# Patient Record
Sex: MALE | Race: WHITE | NOT HISPANIC OR LATINO | Employment: OTHER | URBAN - METROPOLITAN AREA
[De-identification: names, ages, dates, MRNs, and addresses within clinical notes are randomized per-mention and may not be internally consistent; named-entity substitution may affect disease eponyms.]

---

## 2022-09-07 LAB — CRC RECOMMENDATION EXT: NORMAL

## 2022-10-20 LAB
LEFT EYE DM RETINOPATHY: NEGATIVE
RIGHT EYE DM RETINOPATHY: NEGATIVE

## 2023-05-10 LAB
CHOLEST SERPL-MSCNC: 160 MG/DL (ref 0–200)
HBA1C MFR BLD: 7.2 % (ref 4–6)
HDLC SERPL-MCNC: 37 MG/DL (ref 35–70)
LDLC SERPL CALC-MCNC: 57 MG/DL
TRIGL SERPL-MCNC: 328 MG/DL (ref 40–160)

## 2023-05-11 LAB
CHOLEST SERPL-MSCNC: 160 MG/DL (ref 0–200)
HBA1C MFR BLD: 7.2 % (ref 4–6)
HDLC SERPL-MCNC: 37 MG/DL (ref 35–70)
LDLC SERPL CALC-MCNC: 57 MG/DL
TRIGL SERPL-MCNC: 328 MG/DL (ref 40–160)
VLDL CHOLESTEROL: 65.6 MG/DL

## 2023-08-03 ENCOUNTER — TELEPHONE (OUTPATIENT)
Dept: PRIMARY CARE CLINIC | Facility: CLINIC | Age: 73
End: 2023-08-03
Payer: MEDICARE

## 2023-08-03 RX ORDER — OMEPRAZOLE 40 MG/1
40 CAPSULE, DELAYED RELEASE ORAL EVERY MORNING
COMMUNITY
Start: 2023-05-25

## 2023-08-03 RX ORDER — GLIMEPIRIDE 4 MG/1
2 TABLET ORAL DAILY
COMMUNITY
Start: 2023-07-24 | End: 2024-01-09 | Stop reason: SDUPTHER

## 2023-08-03 RX ORDER — BLOOD SUGAR DIAGNOSTIC
STRIP MISCELLANEOUS
COMMUNITY
Start: 2023-05-26 | End: 2023-08-28 | Stop reason: SDUPTHER

## 2023-08-03 RX ORDER — EZETIMIBE AND SIMVASTATIN 10; 40 MG/1; MG/1
1 TABLET ORAL EVERY OTHER DAY
COMMUNITY
Start: 2023-05-19 | End: 2024-02-19

## 2023-08-03 RX ORDER — HYDROCHLOROTHIAZIDE 12.5 MG/1
12.5 TABLET ORAL DAILY
COMMUNITY
Start: 2023-06-03

## 2023-08-03 RX ORDER — MESALAMINE 1.2 G/1
4.8 TABLET, DELAYED RELEASE ORAL
COMMUNITY
Start: 2023-07-14

## 2023-08-03 RX ORDER — PIOGLITAZONEHYDROCHLORIDE 45 MG/1
TABLET ORAL
COMMUNITY
End: 2023-08-22 | Stop reason: ALTCHOICE

## 2023-08-03 RX ORDER — ZOLPIDEM TARTRATE 10 MG/1
10 TABLET ORAL NIGHTLY
COMMUNITY
Start: 2023-07-31

## 2023-08-03 RX ORDER — FENOFIBRATE 134 MG/1
134 CAPSULE ORAL
COMMUNITY
Start: 2023-06-15 | End: 2023-09-19 | Stop reason: SDUPTHER

## 2023-08-03 RX ORDER — FLUOXETINE HYDROCHLORIDE 40 MG/1
40 CAPSULE ORAL EVERY MORNING
COMMUNITY
Start: 2023-07-31

## 2023-08-03 RX ORDER — MONTELUKAST SODIUM 10 MG/1
10 TABLET ORAL DAILY
COMMUNITY
Start: 2023-07-23 | End: 2023-10-25 | Stop reason: SDUPTHER

## 2023-08-03 RX ORDER — FLAXSEED OIL 1000 MG
1 CAPSULE ORAL NIGHTLY
COMMUNITY

## 2023-08-03 RX ORDER — FUROSEMIDE 20 MG/1
20 TABLET ORAL EVERY MORNING
COMMUNITY
Start: 2023-07-31 | End: 2024-03-04 | Stop reason: SDUPTHER

## 2023-08-03 RX ORDER — EMPAGLIFLOZIN 25 MG/1
25 TABLET, FILM COATED ORAL DAILY
COMMUNITY
Start: 2023-05-10

## 2023-08-03 RX ORDER — LANCETS 33 GAUGE
EACH MISCELLANEOUS
COMMUNITY
Start: 2023-05-25 | End: 2023-12-11 | Stop reason: SDUPTHER

## 2023-08-03 RX ORDER — ALPRAZOLAM 0.5 MG/1
0.5 TABLET ORAL 2 TIMES DAILY PRN
COMMUNITY
Start: 2023-03-15

## 2023-08-03 RX ORDER — BUPROPION HYDROCHLORIDE 150 MG/1
150 TABLET ORAL EVERY MORNING
COMMUNITY
Start: 2023-07-31

## 2023-08-03 RX ORDER — METOPROLOL SUCCINATE 50 MG/1
50 TABLET, EXTENDED RELEASE ORAL DAILY
COMMUNITY
Start: 2023-05-24

## 2023-08-03 RX ORDER — LEVOTHYROXINE SODIUM 75 UG/1
75 TABLET ORAL EVERY MORNING
COMMUNITY
Start: 2023-07-23 | End: 2023-10-25 | Stop reason: SDUPTHER

## 2023-08-03 RX ORDER — SITAGLIPTIN AND METFORMIN HYDROCHLORIDE 1000; 50 MG/1; MG/1
1 TABLET, FILM COATED ORAL 2 TIMES DAILY
COMMUNITY
Start: 2023-05-26

## 2023-08-03 NOTE — TELEPHONE ENCOUNTER
Spoke with pt, he stated that he really wasn't having chest pain, he stated that it was more so heartburn. Pt is requesting to change the Viagra to Cialis.     I spoke with Dr. Lockhart, after reviewing the chart, he okay the change to Cialis.     Cialis 10 mg daily prn #30 has been sent to pharmacy on file.

## 2023-08-19 PROBLEM — F32.A DEPRESSION: Status: ACTIVE | Noted: 2023-08-19

## 2023-08-19 PROBLEM — E78.2 MIXED HYPERLIPIDEMIA: Status: ACTIVE | Noted: 2023-08-19

## 2023-08-19 PROBLEM — E66.9 OBESE: Status: ACTIVE | Noted: 2023-08-19

## 2023-08-19 PROBLEM — E11.9 DIABETES MELLITUS, TYPE 2: Status: ACTIVE | Noted: 2023-08-19

## 2023-08-19 PROBLEM — K21.9 GERD (GASTROESOPHAGEAL REFLUX DISEASE): Status: ACTIVE | Noted: 2023-08-19

## 2023-08-19 PROBLEM — G47.33 OSA (OBSTRUCTIVE SLEEP APNEA): Status: ACTIVE | Noted: 2023-08-19

## 2023-08-19 PROBLEM — G47.00 INSOMNIA: Status: ACTIVE | Noted: 2023-08-19

## 2023-08-19 PROBLEM — E55.9 VITAMIN D DEFICIENCY: Status: ACTIVE | Noted: 2023-08-19

## 2023-08-19 PROBLEM — E03.9 HYPOTHYROIDISM: Status: ACTIVE | Noted: 2023-08-19

## 2023-08-19 PROBLEM — I10 HYPERTENSION: Status: ACTIVE | Noted: 2023-08-19

## 2023-08-22 ENCOUNTER — OFFICE VISIT (OUTPATIENT)
Dept: FAMILY MEDICINE | Facility: CLINIC | Age: 73
End: 2023-08-22
Payer: MEDICARE

## 2023-08-22 VITALS
TEMPERATURE: 97 F | HEART RATE: 72 BPM | WEIGHT: 244.81 LBS | BODY MASS INDEX: 37.1 KG/M2 | DIASTOLIC BLOOD PRESSURE: 78 MMHG | RESPIRATION RATE: 18 BRPM | SYSTOLIC BLOOD PRESSURE: 138 MMHG | HEIGHT: 68 IN | OXYGEN SATURATION: 97 %

## 2023-08-22 DIAGNOSIS — E66.01 CLASS 2 SEVERE OBESITY DUE TO EXCESS CALORIES WITH SERIOUS COMORBIDITY AND BODY MASS INDEX (BMI) OF 37.0 TO 37.9 IN ADULT: ICD-10-CM

## 2023-08-22 DIAGNOSIS — E11.9 TYPE 2 DIABETES MELLITUS WITHOUT COMPLICATION, WITHOUT LONG-TERM CURRENT USE OF INSULIN: Primary | ICD-10-CM

## 2023-08-22 DIAGNOSIS — G47.33 OSA (OBSTRUCTIVE SLEEP APNEA): ICD-10-CM

## 2023-08-22 DIAGNOSIS — I10 PRIMARY HYPERTENSION: ICD-10-CM

## 2023-08-22 DIAGNOSIS — E78.2 MIXED HYPERLIPIDEMIA: ICD-10-CM

## 2023-08-22 PROBLEM — E66.812 CLASS 2 SEVERE OBESITY DUE TO EXCESS CALORIES WITH SERIOUS COMORBIDITY AND BODY MASS INDEX (BMI) OF 37.0 TO 37.9 IN ADULT: Status: ACTIVE | Noted: 2023-08-19

## 2023-08-22 LAB — HBA1C MFR BLD: 7.5 %

## 2023-08-22 PROCEDURE — 3078F PR MOST RECENT DIASTOLIC BLOOD PRESSURE < 80 MM HG: ICD-10-PCS | Mod: CPTII,,, | Performed by: FAMILY MEDICINE

## 2023-08-22 PROCEDURE — 3051F HG A1C>EQUAL 7.0%<8.0%: CPT | Mod: CPTII,,, | Performed by: FAMILY MEDICINE

## 2023-08-22 PROCEDURE — 1101F PR PT FALLS ASSESS DOC 0-1 FALLS W/OUT INJ PAST YR: ICD-10-PCS | Mod: CPTII,,, | Performed by: FAMILY MEDICINE

## 2023-08-22 PROCEDURE — 99214 OFFICE O/P EST MOD 30 MIN: CPT | Mod: ,,, | Performed by: FAMILY MEDICINE

## 2023-08-22 PROCEDURE — 83036 HEMOGLOBIN GLYCOSYLATED A1C: CPT | Mod: QW,,, | Performed by: FAMILY MEDICINE

## 2023-08-22 PROCEDURE — 3078F DIAST BP <80 MM HG: CPT | Mod: CPTII,,, | Performed by: FAMILY MEDICINE

## 2023-08-22 PROCEDURE — 1159F PR MEDICATION LIST DOCUMENTED IN MEDICAL RECORD: ICD-10-PCS | Mod: CPTII,,, | Performed by: FAMILY MEDICINE

## 2023-08-22 PROCEDURE — 3075F SYST BP GE 130 - 139MM HG: CPT | Mod: CPTII,,, | Performed by: FAMILY MEDICINE

## 2023-08-22 PROCEDURE — 3051F PR MOST RECENT HEMOGLOBIN A1C LEVEL 7.0 - < 8.0%: ICD-10-PCS | Mod: CPTII,,, | Performed by: FAMILY MEDICINE

## 2023-08-22 PROCEDURE — 4010F PR ACE/ARB THEARPY RXD/TAKEN: ICD-10-PCS | Mod: CPTII,,, | Performed by: FAMILY MEDICINE

## 2023-08-22 PROCEDURE — 1101F PT FALLS ASSESS-DOCD LE1/YR: CPT | Mod: CPTII,,, | Performed by: FAMILY MEDICINE

## 2023-08-22 PROCEDURE — 3008F PR BODY MASS INDEX (BMI) DOCUMENTED: ICD-10-PCS | Mod: CPTII,,, | Performed by: FAMILY MEDICINE

## 2023-08-22 PROCEDURE — 83036 POCT HEMOGLOBIN A1C: ICD-10-PCS | Mod: QW,,, | Performed by: FAMILY MEDICINE

## 2023-08-22 PROCEDURE — 3288F PR FALLS RISK ASSESSMENT DOCUMENTED: ICD-10-PCS | Mod: CPTII,,, | Performed by: FAMILY MEDICINE

## 2023-08-22 PROCEDURE — 3288F FALL RISK ASSESSMENT DOCD: CPT | Mod: CPTII,,, | Performed by: FAMILY MEDICINE

## 2023-08-22 PROCEDURE — 1159F MED LIST DOCD IN RCRD: CPT | Mod: CPTII,,, | Performed by: FAMILY MEDICINE

## 2023-08-22 PROCEDURE — 3008F BODY MASS INDEX DOCD: CPT | Mod: CPTII,,, | Performed by: FAMILY MEDICINE

## 2023-08-22 PROCEDURE — 99214 PR OFFICE/OUTPT VISIT, EST, LEVL IV, 30-39 MIN: ICD-10-PCS | Mod: ,,, | Performed by: FAMILY MEDICINE

## 2023-08-22 PROCEDURE — 4010F ACE/ARB THERAPY RXD/TAKEN: CPT | Mod: CPTII,,, | Performed by: FAMILY MEDICINE

## 2023-08-22 PROCEDURE — 3075F PR MOST RECENT SYSTOLIC BLOOD PRESS GE 130-139MM HG: ICD-10-PCS | Mod: CPTII,,, | Performed by: FAMILY MEDICINE

## 2023-08-22 RX ORDER — LISINOPRIL 10 MG/1
10 TABLET ORAL DAILY
COMMUNITY
Start: 2023-08-19

## 2023-08-22 RX ORDER — CHOLECALCIFEROL (VITAMIN D3) 25 MCG
5000 TABLET ORAL DAILY
COMMUNITY

## 2023-08-22 RX ORDER — ASPIRIN 81 MG/1
81 TABLET ORAL DAILY
COMMUNITY
End: 2024-03-04

## 2023-08-22 RX ORDER — ASCORBIC ACID 500 MG
250 TABLET ORAL DAILY
COMMUNITY

## 2023-08-22 NOTE — ASSESSMENT & PLAN NOTE
Had a lengthy discussion with patient about his diet.  Sounds like he has been eating quite a bit of fruit lately.  Although he says he is eating fresh fruits, I think he is eating too much and that is why his A1c has gone up.  Patient voiced a clear understanding and will decrease the consumption of fruit.

## 2023-08-22 NOTE — ASSESSMENT & PLAN NOTE
Patient's LDL is at goal. His last LDL was 57 on May 11, 2023.  He will continue with Vytorin 10/40  Daily and fenofibrate 134 mg daily.

## 2023-08-22 NOTE — PROGRESS NOTES
Patient Name: Rocael Das     Patient ID: [id number]     Chief Complaint: Follow-up (Here for 3 month follow up and A1C today./A1C = 7.5%)      HPI:     Rocael Das is a 72 y.o. male here today for follow-up of his diabetes and hypertension.  Patient says he has been feeling fine.    Past Medical History:   Diagnosis Date    Depression     Diabetes mellitus, type 2     GERD (gastroesophageal reflux disease)     H/O prostate cancer     Hypertension     Hypothyroidism     Insomnia     Mixed hyperlipidemia     Obese     DAWSON (obstructive sleep apnea)     Vitamin D deficiency         Past Surgical History:   Procedure Laterality Date    CRYOTHERAPY      PROSTATE SURGERY          Social History     Socioeconomic History    Marital status:     Number of children: 1   Tobacco Use    Smoking status: Former     Current packs/day: 0.00     Types: Cigarettes     Quit date:      Years since quittin.6    Smokeless tobacco: Never   Substance and Sexual Activity    Drug use: Never        Current Outpatient Medications   Medication Instructions    ALPRAZolam (XANAX) 0.5 mg, Oral, 2 times daily PRN    ascorbic acid (vitamin C) (VITAMIN C) 250 mg, Oral, Daily    aspirin (ECOTRIN) 81 mg, Oral, Daily    buPROPion (WELLBUTRIN XL) 150 mg, Oral, Every morning    ezetimibe-simvastatin 10-40 mg (VYTORIN) 10-40 mg per tablet 1 tablet, Oral, Every other day    fenofibrate micronized (LOFIBRA) 134 mg, Oral    ferrous sulfate (IRON ORAL) 65 mg, Oral, Daily    FLUoxetine 40 mg, Oral, Every morning    furosemide (LASIX) 20 mg, Oral, Every morning    glimepiride (AMARYL) 2 mg, Oral, 2 times daily    glucosamine chauhan 2KCl-chondroit 750-600 mg Tab 1 tablet, Oral, Nightly    hydroCHLOROthiazide (HYDRODIURIL) 12.5 mg, Oral    JANUMET 50-1,000 mg per tablet 1 tablet, Oral, 2 times daily    JARDIANCE 25 mg, Oral, Daily    L.acidophil/L.plantar/Bifido 7 (UP4 PROBIOTICS ADULT ORAL) Oral, Every Mon, Wed, Fri    levothyroxine  "(SYNTHROID) 75 mcg, Oral, Every morning    lisinopriL 10 mg, Oral, Daily    mesalamine (LIALDA) 4.8 g, Oral    metoprolol succinate (TOPROL-XL) 50 mg, Oral, Daily    montelukast (SINGULAIR) 10 mg, Oral, Daily    omeprazole (PRILOSEC) 40 mg, Oral, Every morning    ONETOUCH DELICA PLUS LANCET 33 gauge Misc SMARTSIG:Via Meter Daily    ONETOUCH ULTRA TEST Strp SMARTSIG:Via Meter Daily    vitamin D (VITAMIN D3) 50,000 Units, Oral, Daily    zolpidem (AMBIEN) 10 mg, Oral, Nightly       Review of patient's allergies indicates:   Allergen Reactions    Balsalazide Other (See Comments)    Macrolide antibiotics     Pcn [penicillins]     Erythromycin Rash        Immunization History   Administered Date(s) Administered    COVID-19, MRNA, LN-S, PF (Pfizer) (Purple Cap) 09/02/2021, 09/24/2021    Influenza (FLUAD) - Quadrivalent - Adjuvanted - PF *Preferred* (65+) 10/16/2020    Influenza - High Dose - PF (65 years and older) 01/09/2018, 08/28/2018, 10/02/2019    Influenza - Quadrivalent - PF *Preferred* (6 months and older) 10/30/2002, 01/21/2005, 10/13/2015    Pneumococcal Conjugate - 13 Valent 01/09/2018    Pneumococcal Polysaccharide - 23 Valent 10/30/2002, 10/02/2019    Zoster 12/11/2012    Zoster Recombinant 06/07/2018, 08/28/2018       Patient Care Team:  Donnie Das Sr., MD as PCP - General (Family Medicine)  Hardeep Ochoa FNP (Nurse Practitioner)  Andrey Borja MD as Consulting Physician (Gastroenterology)  Jourdan Flanagan MD as Consulting Physician (Pulmonary Disease)     Subjective:     Review of Systems    10 point review of systems conducted, negative except as stated in the history of present illness. See HPI for details.    Objective:     Visit Vitals  /78 (BP Location: Left arm, Patient Position: Sitting, BP Method: Large (Manual))   Pulse 72   Temp 96.5 °F (35.8 °C)   Resp 18   Ht 5' 8" (1.727 m)   Wt 111 kg (244 lb 12.8 oz)   SpO2 97%   BMI 37.22 kg/m²       Physical Exam  Constitutional:  "      Appearance: He is obese.   HENT:      Head: Normocephalic and atraumatic.   Cardiovascular:      Rate and Rhythm: Normal rate and regular rhythm.   Pulmonary:      Effort: Pulmonary effort is normal.      Breath sounds: Normal breath sounds.   Abdominal:      Palpations: Abdomen is soft.   Musculoskeletal:      Cervical back: Normal range of motion and neck supple.      Right lower leg: No edema.      Left lower leg: No edema.   Skin:     General: Skin is warm and dry.   Neurological:      General: No focal deficit present.      Mental Status: He is alert and oriented to person, place, and time.   Psychiatric:         Mood and Affect: Mood normal.           Assessment:       ICD-10-CM ICD-9-CM   1. Type 2 diabetes mellitus without complication, without long-term current use of insulin  E11.9 250.00   2. Primary hypertension  I10 401.9   3. Mixed hyperlipidemia  E78.2 272.2   4. Class 2 severe obesity due to excess calories with serious comorbidity and body mass index (BMI) of 37.0 to 37.9 in adult  E66.01 278.01    Z68.37 V85.37   5. DAWSON (obstructive sleep apnea)  G47.33 327.23        Plan:     1. Type 2 diabetes mellitus without complication, without long-term current use of insulin  Overview:  Continue present med tx.  Glimepiride 2 mg b.i.d.  ,  Janumet 50/1000 1 b.i.d., Jardiance 25 mg daily.                                                         Follow ADA Diet. Avoid soda, simple sweets, and limit rice/pasta/breads/starches (no more than 45-50 grams per meal).  Maintain healthy weight with goal BMI <30.  Exercise 5 times per week for 30 minutes per day.  Stressed importance of daily foot exams especially if neuropathy is present.  Patient was instructed to notify physician if they notice any sores or skin lesions on the feet.  Stressed the importance of wearing proper fitting comfortable shoes i.e. diabetic footwear.  They were instructed to always wear shoes and never go barefooted.  Stressed importance  of annual dilated eye exam.          Assessment & Plan:  Had a lengthy discussion with patient about his diet.  Sounds like he has been eating quite a bit of fruit lately.  Although he says he is eating fresh fruits, I think he is eating too much and that is why his A1c has gone up.  Patient voiced a clear understanding and will decrease the consumption of fruit.    Orders:  -     Hemoglobin A1C, POCT    2. Primary hypertension  Overview:  Continue present med tx.  Metoprolol 50 mg daily, lisinopril 10 mg daily, furosemide 20 mg q.a.m. HCTZ 12.5 mg daily.      Low Sodium Diet (DASH Diet - Less than 2 grams of sodium per day).  Monitor blood pressure daily and log. Report consistent numbers greater than 140/90.  Maintain healthy weight with goal BMI <30. Exercise 30 minutes per day, 5 days per week.    Assessment & Plan:  Patient's blood pressure is at goal.      3. Mixed hyperlipidemia  Overview:  Stressed importance of dietary modifications. Follow a low cholesterol, low saturated fat diet with less that 200mg of cholesterol a day.  Avoid fried foods and high saturated fats (high saturated fats less than 7% of calories).  Add Flax Seed/Fish Oil supplements to diet. Increase dietary fiber.  Regular exercise can reduce LDL and raise HDL. Stressed importance of physical activity 5 times per week for 30 minutes per day.     Assessment & Plan:  Patient's LDL is at goal. His last LDL was 57 on May 11, 2023.  He will continue with Vytorin 10/40  Daily and fenofibrate 134 mg daily.      4. Class 2 severe obesity due to excess calories with serious comorbidity and body mass index (BMI) of 37.0 to 37.9 in adult  Overview:  Body mass index is 37.22 kg/m².  Goal BMI <30.  Exercise 5 times a week for 30 minutes per day.  Avoid soda, simple sugars, excessive rice, potatoes or bread. Limit fast foods and fried foods.  Choose complex carbs in moderation (example: green vegetables, beans, oatmeal). Eat plenty of fresh fruits and  vegetables with lean meats daily.  Do not skip meals. Eat a balanced portion size.  Avoid fad diets. Consider permanent healthy life style changes.       5. DAWSON (obstructive sleep apnea)  Overview:  Wears CPAP/BiPAP and reports compliance nightly. Life time use expected.  Has experienced improved daytime fatigue.  Avoid excessive alcohol, smoking and overuse of sedatives at bedtime.  Weight management discussed. Goal BMI <30.  Adjust sleep position PRN.          [x] Discussed lab findings with the patient.  [x] Discussed diet, exercise and if appropriate, weight loss.  [] Instructions and information, including risks and benefits of prescribed medication(s) have been reviewed with the patient and patient verbalizes understanding. Questions have been answered to the patient's satisfaction.  [] Appropriate counseling has been given regarding anxiety and depressive issues that were discussed today.  [] Any lab drawn today will be reviewed by physician at the time it is received and appropriate recommendations bill be made and discussed with patient.     No follow-ups on file.   In addition to their scheduled follow up, the patient has also been instructed to follow up on as needed basis.     Future Appointments   Date Time Provider Department Center   11/15/2023  1:00 PM Donnie Das Sr., MD [facility] Das Sr, MD

## 2023-08-28 DIAGNOSIS — E11.9 TYPE 2 DIABETES MELLITUS WITHOUT COMPLICATION, WITHOUT LONG-TERM CURRENT USE OF INSULIN: Primary | ICD-10-CM

## 2023-08-29 ENCOUNTER — TELEPHONE (OUTPATIENT)
Dept: FAMILY MEDICINE | Facility: CLINIC | Age: 73
End: 2023-08-29
Payer: MEDICARE

## 2023-08-29 NOTE — TELEPHONE ENCOUNTER
----- Message from Daniela Schultz sent at 8/28/2023  2:54 PM CDT -----  Regarding: Refill  Need One touch ultra test strips.  States the container shows 2 refills but pharmacy will not fill without a new order.  Wants enough strips to test twice a day, without having to refill as often.    Send order to [facility] in [place].

## 2023-09-19 ENCOUNTER — TELEPHONE (OUTPATIENT)
Dept: FAMILY MEDICINE | Facility: CLINIC | Age: 73
End: 2023-09-19
Payer: MEDICARE

## 2023-09-19 DIAGNOSIS — E78.2 MIXED HYPERLIPIDEMIA: Primary | ICD-10-CM

## 2023-09-19 RX ORDER — FENOFIBRATE 134 MG/1
134 CAPSULE ORAL DAILY
Qty: 90 CAPSULE | Refills: 2 | Status: SHIPPED | OUTPATIENT
Start: 2023-09-19 | End: 2024-03-04 | Stop reason: ALTCHOICE

## 2023-09-19 NOTE — TELEPHONE ENCOUNTER
Spoke with patient and he confirmed it was [facility] to refill his medication. Medication refilled.PH

## 2023-09-19 NOTE — TELEPHONE ENCOUNTER
----- Message from Daniela Schultz sent at 9/19/2023  1:35 PM CDT -----  Regarding: Refill  Second request.    Fenofibrate 100 mg  [facility].

## 2023-09-28 ENCOUNTER — OFFICE VISIT (OUTPATIENT)
Dept: FAMILY MEDICINE | Facility: CLINIC | Age: 73
End: 2023-09-28
Payer: MEDICARE

## 2023-09-28 VITALS — WEIGHT: 249.19 LBS | BODY MASS INDEX: 37.77 KG/M2 | HEIGHT: 68 IN

## 2023-09-28 DIAGNOSIS — L73.9 FOLLICULITIS: Primary | ICD-10-CM

## 2023-09-28 PROCEDURE — 4010F PR ACE/ARB THEARPY RXD/TAKEN: ICD-10-PCS | Mod: CPTII,,, | Performed by: FAMILY MEDICINE

## 2023-09-28 PROCEDURE — 99213 OFFICE O/P EST LOW 20 MIN: CPT | Mod: ,,, | Performed by: FAMILY MEDICINE

## 2023-09-28 PROCEDURE — 3008F PR BODY MASS INDEX (BMI) DOCUMENTED: ICD-10-PCS | Mod: CPTII,,, | Performed by: FAMILY MEDICINE

## 2023-09-28 PROCEDURE — 1159F MED LIST DOCD IN RCRD: CPT | Mod: CPTII,,, | Performed by: FAMILY MEDICINE

## 2023-09-28 PROCEDURE — 1159F PR MEDICATION LIST DOCUMENTED IN MEDICAL RECORD: ICD-10-PCS | Mod: CPTII,,, | Performed by: FAMILY MEDICINE

## 2023-09-28 PROCEDURE — 3051F HG A1C>EQUAL 7.0%<8.0%: CPT | Mod: CPTII,,, | Performed by: FAMILY MEDICINE

## 2023-09-28 PROCEDURE — 99213 PR OFFICE/OUTPT VISIT, EST, LEVL III, 20-29 MIN: ICD-10-PCS | Mod: ,,, | Performed by: FAMILY MEDICINE

## 2023-09-28 PROCEDURE — 4010F ACE/ARB THERAPY RXD/TAKEN: CPT | Mod: CPTII,,, | Performed by: FAMILY MEDICINE

## 2023-09-28 PROCEDURE — 3008F BODY MASS INDEX DOCD: CPT | Mod: CPTII,,, | Performed by: FAMILY MEDICINE

## 2023-09-28 PROCEDURE — 3051F PR MOST RECENT HEMOGLOBIN A1C LEVEL 7.0 - < 8.0%: ICD-10-PCS | Mod: CPTII,,, | Performed by: FAMILY MEDICINE

## 2023-09-28 RX ORDER — DOXYCYCLINE HYCLATE 50 MG/1
50 CAPSULE ORAL 2 TIMES DAILY
Qty: 20 CAPSULE | Refills: 0 | Status: SHIPPED | OUTPATIENT
Start: 2023-09-28 | End: 2023-10-08

## 2023-09-28 RX ORDER — MUPIROCIN CALCIUM 20 MG/G
CREAM TOPICAL 2 TIMES DAILY
Qty: 30 G | Refills: 0 | Status: SHIPPED | OUTPATIENT
Start: 2023-09-28 | End: 2024-03-04 | Stop reason: ALTCHOICE

## 2023-09-28 NOTE — PROGRESS NOTES
Patient Name: Rocael Das     Patient ID: [id number]     Chief Complaint: Rash (Here for rash under both arm pits, its been about three weeks. Used Calamine lotion , Cortizone 10, Neosporin Oint, Medicated powder.)      HPI:     Rocael Das is a 72 y.o. male here today for bilateral axilla rash.    Past Medical History:   Diagnosis Date    Depression     Diabetes mellitus, type 2     GERD (gastroesophageal reflux disease)     H/O prostate cancer     Hypertension     Hypothyroidism     Insomnia     Mixed hyperlipidemia     Obese     DAWSON (obstructive sleep apnea)     Vitamin D deficiency         Past Surgical History:   Procedure Laterality Date    CRYOTHERAPY      PROSTATE SURGERY          Social History     Socioeconomic History    Marital status:     Number of children: 1   Tobacco Use    Smoking status: Former     Current packs/day: 0.00     Types: Cigarettes     Quit date:      Years since quittin.7    Smokeless tobacco: Never   Substance and Sexual Activity    Drug use: Never        Current Outpatient Medications   Medication Instructions    ALPRAZolam (XANAX) 0.5 mg, Oral, 2 times daily PRN    ascorbic acid (vitamin C) (VITAMIN C) 250 mg, Oral, Daily    aspirin (ECOTRIN) 81 mg, Oral, Daily    blood sugar diagnostic (ONETOUCH ULTRA TEST) Strp Use to test blood sugar once daily    buPROPion (WELLBUTRIN XL) 150 mg, Oral, Every morning    ezetimibe-simvastatin 10-40 mg (VYTORIN) 10-40 mg per tablet 1 tablet, Oral, Every other day    fenofibrate micronized (LOFIBRA) 134 mg, Oral, Daily    ferrous sulfate (IRON ORAL) 65 mg, Oral, Daily    FLUoxetine 40 mg, Oral, Every morning    furosemide (LASIX) 20 mg, Oral, Every morning    glimepiride (AMARYL) 2 mg, Oral, Daily, Patient takes 2 mg by mouth after breakfast and  4 mg by mouth every evening after supper.    glucosamine chauhan 2KCl-chondroit 750-600 mg Tab 1 tablet, Oral, Nightly    hydroCHLOROthiazide (HYDRODIURIL) 12.5 mg, Oral    JANUMET  "50-1,000 mg per tablet 1 tablet, Oral, 2 times daily    JARDIANCE 25 mg, Oral, Daily    L.acidophil/L.plantar/Bifido 7 (UP4 PROBIOTICS ADULT ORAL) Oral, Every Mon, Wed, Fri    levothyroxine (SYNTHROID) 75 mcg, Oral, Every morning    lisinopriL 10 mg, Oral, Daily    mesalamine (LIALDA) 4.8 g, Oral, With breakfast    metoprolol succinate (TOPROL-XL) 50 mg, Oral, Daily    montelukast (SINGULAIR) 10 mg, Oral, Daily    omeprazole (PRILOSEC) 40 mg, Oral, Every morning    ONETOUCH DELICA PLUS LANCET 33 gauge Misc SMARTSIG:Via Meter Daily    vitamin D (VITAMIN D3) 5,000 Units, Oral, Daily    zolpidem (AMBIEN) 10 mg, Oral, Nightly       Review of patient's allergies indicates:   Allergen Reactions    Balsalazide Other (See Comments)    Macrolide antibiotics     Pcn [penicillins]     Erythromycin Rash        Immunization History   Administered Date(s) Administered    COVID-19, MRNA, LN-S, PF (Pfizer) (Purple Cap) 09/02/2021, 09/24/2021    Influenza (FLUAD) - Quadrivalent - Adjuvanted - PF *Preferred* (65+) 10/16/2020    Influenza - High Dose - PF (65 years and older) 01/09/2018, 08/28/2018, 10/02/2019    Influenza - Quadrivalent - PF *Preferred* (6 months and older) 10/30/2002, 01/21/2005, 10/13/2015    Pneumococcal Conjugate - 13 Valent 01/09/2018    Pneumococcal Polysaccharide - 23 Valent 10/30/2002, 10/02/2019    Zoster 12/11/2012    Zoster Recombinant 06/07/2018, 08/28/2018       Patient Care Team:  Donnie Das Sr., MD as PCP - General (Family Medicine)  Hardeep Ochoa FNP (Nurse Practitioner)  Andrey Borja MD as Consulting Physician (Gastroenterology)  Jourdan Flanagan MD as Consulting Physician (Pulmonary Disease)     Subjective:     Review of Systems    10 point review of systems conducted, negative except as stated in the history of present illness. See HPI for details.    Objective:     Visit Vitals  Ht 5' 8" (1.727 m)   Wt 113 kg (249 lb 3.2 oz)   BMI 37.89 kg/m²       Physical " Exam  Constitutional:       Appearance: He is obese.   Cardiovascular:      Rate and Rhythm: Normal rate and regular rhythm.   Pulmonary:      Effort: Pulmonary effort is normal.      Breath sounds: Normal breath sounds.   Abdominal:      Palpations: Abdomen is soft.   Musculoskeletal:      Right lower leg: No edema.      Left lower leg: No edema.   Skin:     Comments: Patient has a fine, reddened,maculopapular rash under both arms.   Neurological:      Mental Status: He is alert.           Assessment:       ICD-10-CM ICD-9-CM   1. Folliculitis axillary  L73.9 704.8        Plan:     1. Folliculitis axillary  Comments:  Keep area cool and dry. Will start Doxycycline 50 mg BID for 10 days and Bactroban 2% apply to affected area BID.        [] Discussed lab findings with the patient.  [] Discussed diet, exercise and if appropriate, weight loss.  [x] Instructions and information, including risks and benefits of prescribed medication(s) have been reviewed with the patient and patient verbalizes understanding. Questions have been answered to the patient's satisfaction.  [] Appropriate counseling has been given regarding anxiety and depressive issues that were discussed today.  [] Any lab drawn today will be reviewed by physician at the time it is received and appropriate recommendations bill be made and discussed with patient.     Follow up if symptoms worsen or fail to improve.   In addition to their scheduled follow up, the patient has also been instructed to follow up on as needed basis.     Future Appointments   Date Time Provider Department Center   11/15/2023  1:00 PM Donnie Das Sr., MD [facility] Das Sr, MD

## 2023-10-25 ENCOUNTER — TELEPHONE (OUTPATIENT)
Dept: FAMILY MEDICINE | Facility: CLINIC | Age: 73
End: 2023-10-25
Payer: MEDICARE

## 2023-10-25 DIAGNOSIS — E03.9 HYPOTHYROIDISM, UNSPECIFIED TYPE: ICD-10-CM

## 2023-10-25 DIAGNOSIS — T78.40XS ALLERGY, SEQUELA: Primary | ICD-10-CM

## 2023-10-25 RX ORDER — MONTELUKAST SODIUM 10 MG/1
10 TABLET ORAL DAILY
Qty: 90 TABLET | Refills: 1 | Status: SHIPPED | OUTPATIENT
Start: 2023-10-25

## 2023-10-25 RX ORDER — LEVOTHYROXINE SODIUM 75 UG/1
75 TABLET ORAL EVERY MORNING
Qty: 90 TABLET | Refills: 1 | Status: SHIPPED | OUTPATIENT
Start: 2023-10-25

## 2023-10-25 NOTE — TELEPHONE ENCOUNTER
----- Message from Dolores Carson sent at 10/23/2023  2:13 PM CDT -----  Regarding: MED REFILL  HE NEEDS REFILLS CALLED TO [facility]

## 2023-12-11 ENCOUNTER — TELEPHONE (OUTPATIENT)
Dept: FAMILY MEDICINE | Facility: CLINIC | Age: 73
End: 2023-12-11
Payer: MEDICARE

## 2023-12-11 DIAGNOSIS — E11.9 TYPE 2 DIABETES MELLITUS WITHOUT COMPLICATION, WITHOUT LONG-TERM CURRENT USE OF INSULIN: ICD-10-CM

## 2023-12-11 RX ORDER — LANCETS 33 GAUGE
EACH MISCELLANEOUS
Qty: 100 EACH | Refills: 11 | Status: SHIPPED | OUTPATIENT
Start: 2023-12-11

## 2023-12-11 NOTE — TELEPHONE ENCOUNTER
Called patient and notified him that his Lancets and test strips were refilled at [facility] as requested.

## 2023-12-11 NOTE — TELEPHONE ENCOUNTER
----- Message from Daniela Schultz sent at 12/11/2023 10:11 AM CST -----  Regarding: lancets  Needs refill 1 touch lancets.  KASSANDRA BRYAN

## 2023-12-12 ENCOUNTER — DOCUMENTATION ONLY (OUTPATIENT)
Dept: FAMILY MEDICINE | Facility: CLINIC | Age: 73
End: 2023-12-12
Payer: MEDICARE

## 2023-12-12 LAB
LEFT EYE DM RETINOPATHY: NEGATIVE
RIGHT EYE DM RETINOPATHY: NEGATIVE

## 2024-01-09 ENCOUNTER — TELEPHONE (OUTPATIENT)
Dept: FAMILY MEDICINE | Facility: CLINIC | Age: 74
End: 2024-01-09
Payer: MEDICARE

## 2024-01-09 DIAGNOSIS — E11.9 TYPE 2 DIABETES MELLITUS WITHOUT COMPLICATION, WITHOUT LONG-TERM CURRENT USE OF INSULIN: Primary | ICD-10-CM

## 2024-01-09 RX ORDER — GLIMEPIRIDE 4 MG/1
TABLET ORAL
Qty: 135 TABLET | Refills: 0 | Status: SHIPPED | OUTPATIENT
Start: 2024-01-09

## 2024-01-09 NOTE — TELEPHONE ENCOUNTER
----- Message from Dolores Carson sent at 1/8/2024  8:39 AM CST -----  Regarding: MED REFILL  HE NEEDS HIS GLIMPRIDE 40MG, HE SAID DOC INCREASED AND [facility] WON'T REFILL BECAUSE IT IS TOO SOON ACCORDING TO SCRIPT

## 2024-02-09 NOTE — TELEPHONE ENCOUNTER
Called and spoke with patient and advised him , he needs to call [facility] and let them know he will no longer be filling his medication of Jardiance 25 mg with them . Switching to [facility] in [place]. He will call . Patient verbalized an understanding.

## 2024-02-09 NOTE — TELEPHONE ENCOUNTER
----- Message from Dolores Carson sent at 2/9/2024 10:08 AM CST -----  Regarding: MED REFILL  KASSANDRA JENKINS CALLED ABOUT HIS JARDIANCE

## 2024-02-19 DIAGNOSIS — E78.2 MIXED HYPERLIPIDEMIA: Primary | ICD-10-CM

## 2024-02-19 RX ORDER — EZETIMIBE AND SIMVASTATIN 10; 40 MG/1; MG/1
1 TABLET ORAL NIGHTLY
Qty: 90 TABLET | Refills: 1 | Status: SHIPPED | OUTPATIENT
Start: 2024-02-19 | End: 2025-02-18

## 2024-03-04 ENCOUNTER — DOCUMENTATION ONLY (OUTPATIENT)
Dept: FAMILY MEDICINE | Facility: CLINIC | Age: 74
End: 2024-03-04

## 2024-03-04 ENCOUNTER — OFFICE VISIT (OUTPATIENT)
Dept: FAMILY MEDICINE | Facility: CLINIC | Age: 74
End: 2024-03-04
Payer: MEDICARE

## 2024-03-04 VITALS
DIASTOLIC BLOOD PRESSURE: 70 MMHG | OXYGEN SATURATION: 95 % | RESPIRATION RATE: 20 BRPM | SYSTOLIC BLOOD PRESSURE: 124 MMHG | HEIGHT: 68 IN | HEART RATE: 62 BPM | BODY MASS INDEX: 37.13 KG/M2 | WEIGHT: 245 LBS

## 2024-03-04 DIAGNOSIS — G47.33 OSA (OBSTRUCTIVE SLEEP APNEA): ICD-10-CM

## 2024-03-04 DIAGNOSIS — I10 PRIMARY HYPERTENSION: ICD-10-CM

## 2024-03-04 DIAGNOSIS — E11.9 TYPE 2 DIABETES MELLITUS WITHOUT COMPLICATION, WITHOUT LONG-TERM CURRENT USE OF INSULIN: Primary | ICD-10-CM

## 2024-03-04 DIAGNOSIS — E66.01 CLASS 2 SEVERE OBESITY DUE TO EXCESS CALORIES WITH SERIOUS COMORBIDITY AND BODY MASS INDEX (BMI) OF 37.0 TO 37.9 IN ADULT: ICD-10-CM

## 2024-03-04 DIAGNOSIS — E78.2 MIXED HYPERLIPIDEMIA: ICD-10-CM

## 2024-03-04 LAB
BILIRUB SERPL-MCNC: NORMAL MG/DL
BLOOD URINE, POC: NORMAL
CLARITY, POC UA: CLEAR
COLOR, POC UA: YELLOW
CREATININE, URINE: 50 MG/DL
GLUCOSE UR QL STRIP: NORMAL
HBA1C MFR BLD: 7.3 %
KETONES UR QL STRIP: NORMAL
LEUKOCYTE ESTERASE URINE, POC: NORMAL
MICROALBUMIN URINE: 10
MICROALBUMIN/CREATININE RATIO: <30 UG/MG
NITRITE, POC UA: NORMAL
PH, POC UA: 5.5
POC CREATININE: 50 MG/DL (ref 0.6–1.3)
POC MICROALBUMIN URINE: 10
POC MICROALBUMIN/CREATININE RATIO: <30 ΜG/MG (ref 0–30)
PROTEIN, POC: NORMAL
SPECIFIC GRAVITY, POC UA: 1.02
UROBILINOGEN, POC UA: NORMAL

## 2024-03-04 PROCEDURE — 82044 UR ALBUMIN SEMIQUANTITATIVE: CPT | Mod: QW,,, | Performed by: FAMILY MEDICINE

## 2024-03-04 PROCEDURE — 3051F HG A1C>EQUAL 7.0%<8.0%: CPT | Mod: CPTII,,, | Performed by: FAMILY MEDICINE

## 2024-03-04 PROCEDURE — 3074F SYST BP LT 130 MM HG: CPT | Mod: CPTII,,, | Performed by: FAMILY MEDICINE

## 2024-03-04 PROCEDURE — 3288F FALL RISK ASSESSMENT DOCD: CPT | Mod: CPTII,,, | Performed by: FAMILY MEDICINE

## 2024-03-04 PROCEDURE — 1101F PT FALLS ASSESS-DOCD LE1/YR: CPT | Mod: CPTII,,, | Performed by: FAMILY MEDICINE

## 2024-03-04 PROCEDURE — 3008F BODY MASS INDEX DOCD: CPT | Mod: CPTII,,, | Performed by: FAMILY MEDICINE

## 2024-03-04 PROCEDURE — 99214 OFFICE O/P EST MOD 30 MIN: CPT | Mod: ,,, | Performed by: FAMILY MEDICINE

## 2024-03-04 PROCEDURE — 3078F DIAST BP <80 MM HG: CPT | Mod: CPTII,,, | Performed by: FAMILY MEDICINE

## 2024-03-04 PROCEDURE — 81002 URINALYSIS NONAUTO W/O SCOPE: CPT | Mod: ,,, | Performed by: FAMILY MEDICINE

## 2024-03-04 PROCEDURE — 83036 HEMOGLOBIN GLYCOSYLATED A1C: CPT | Mod: QW,,, | Performed by: FAMILY MEDICINE

## 2024-03-04 PROCEDURE — 4010F ACE/ARB THERAPY RXD/TAKEN: CPT | Mod: CPTII,,, | Performed by: FAMILY MEDICINE

## 2024-03-04 RX ORDER — TIRZEPATIDE 2.5 MG/.5ML
2.5 INJECTION, SOLUTION SUBCUTANEOUS
Qty: 4 PEN | Refills: 0 | Status: SHIPPED | OUTPATIENT
Start: 2024-03-04 | End: 2024-06-11 | Stop reason: DRUGHIGH

## 2024-03-04 RX ORDER — ASPIRIN 81 MG/1
81 TABLET ORAL DAILY
COMMUNITY

## 2024-03-04 RX ORDER — GLIMEPIRIDE 4 MG/1
TABLET ORAL
Qty: 135 TABLET | Refills: 0 | Status: SHIPPED | OUTPATIENT
Start: 2024-03-04 | End: 2024-06-06 | Stop reason: SDUPTHER

## 2024-03-04 RX ORDER — OXYBUTYNIN CHLORIDE 5 MG/1
5 TABLET ORAL 2 TIMES DAILY
COMMUNITY
Start: 2024-02-28

## 2024-03-04 RX ORDER — FUROSEMIDE 20 MG/1
20 TABLET ORAL EVERY MORNING
Qty: 90 EACH | Refills: 1 | Status: SHIPPED | OUTPATIENT
Start: 2024-03-04 | End: 2024-05-23 | Stop reason: SDUPTHER

## 2024-03-04 RX ORDER — TIRZEPATIDE 5 MG/.5ML
5 INJECTION, SOLUTION SUBCUTANEOUS
Qty: 8 PEN | Refills: 2 | Status: SHIPPED | OUTPATIENT
Start: 2024-03-04 | End: 2024-06-11 | Stop reason: DRUGHIGH

## 2024-03-04 NOTE — ASSESSMENT & PLAN NOTE
Patient's last A1c was done in the office today and it was 7.3.  I had a discussion with the patient regarding the new GLP agonist injections and he was amenable to trying 1 of these.  We will issue a prescription for Mounjaro 2.5 mg subQ weekly.  We will continue to monitor

## 2024-03-04 NOTE — PROGRESS NOTES
Patient Name: Rocael Das     Patient ID: [id number]     Chief Complaint: Diabetes (Patient here today for a A1C. A1C 7.3%)      HPI:     Rocael Das is a 73 y.o. male here today for follow-up of diabetes,hypertension and hyperlipidemia.    Past Medical History:   Diagnosis Date    Depression     Diabetes mellitus, type 2     GERD (gastroesophageal reflux disease)     H/O prostate cancer     Hypertension     Hypothyroidism     Insomnia     Mixed hyperlipidemia     Obese     DAWSON (obstructive sleep apnea)     Vitamin D deficiency         Past Surgical History:   Procedure Laterality Date    COLONOSCOPY  2022    Dr Borja  Repeat 2 years    CRYOTHERAPY  2013    PROSTATE SURGERY  2013        Social History     Socioeconomic History    Marital status:     Number of children: 1   Tobacco Use    Smoking status: Former     Current packs/day: 0.00     Types: Cigarettes     Quit date:      Years since quittin.2    Smokeless tobacco: Never   Substance and Sexual Activity    Drug use: Never        Current Outpatient Medications   Medication Instructions    ALPRAZolam (XANAX) 0.5 mg, Oral, 2 times daily PRN    ascorbic acid (vitamin C) (VITAMIN C) 250 mg, Oral, Daily    aspirin (ECOTRIN) 81 mg, Oral, Daily    blood sugar diagnostic (ONETOUCH ULTRA TEST) Strp Use to test blood sugar once daily    buPROPion (WELLBUTRIN XL) 150 mg, Oral, Every morning    ezetimibe-simvastatin 10-40 mg (VYTORIN) 10-40 mg per tablet 1 tablet, Oral, Nightly    ferrous sulfate (IRON ORAL) 65 mg, Oral, Daily    FLUoxetine 40 mg, Oral, Every morning    furosemide (LASIX) 20 mg, Oral, Every morning    glimepiride (AMARYL) 4 MG tablet Patient takes 2 mg by mouth after breakfast and  4 mg by mouth every evening after supper.    glucosamine chauhan 2KCl-chondroit 750-600 mg Tab 1 tablet, Oral, Nightly    hydroCHLOROthiazide (HYDRODIURIL) 12.5 mg, Oral, Daily    JANUMET 50-1,000 mg per tablet 1 tablet, Oral, 2 times daily     JARDIANCE 25 mg, Oral, Daily    L.acidophil/L.plantar/Bifido 7 (UP4 PROBIOTICS ADULT ORAL) Oral, Every Mon, Wed, Fri    levothyroxine (SYNTHROID) 75 mcg, Oral, Every morning    lisinopriL 10 mg, Oral, Daily    mesalamine (LIALDA) 4.8 g, Oral, With breakfast    metoprolol succinate (TOPROL-XL) 50 mg, Oral, Daily    montelukast (SINGULAIR) 10 mg, Oral, Daily    mupirocin calcium 2% (BACTROBAN) 2 % cream Topical (Top), 2 times daily    omeprazole (PRILOSEC) 40 mg, Oral, Every morning    ONETOUCH DELICA PLUS LANCET 33 gauge Misc SMARTSIG:Via Meter Daily    oxybutynin (DITROPAN) 5 mg, Oral, 2 times daily    vitamin D (VITAMIN D3) 5,000 Units, Oral, Daily    zolpidem (AMBIEN) 10 mg, Oral, Nightly       Review of patient's allergies indicates:   Allergen Reactions    Balsalazide Other (See Comments)    Macrolide antibiotics     Pcn [penicillins]     Erythromycin Rash        Immunization History   Administered Date(s) Administered    COVID-19, MRNA, LN-S, PF (Pfizer) (Purple Cap) 09/02/2021, 09/24/2021    Influenza (FLUAD) - Quadrivalent - Adjuvanted - PF *Preferred* (65+) 10/16/2020    Influenza - High Dose - PF (65 years and older) 01/09/2018, 08/28/2018, 10/02/2019    Influenza - Quadrivalent - PF *Preferred* (6 months and older) 10/30/2002, 01/21/2005, 10/13/2015    Pneumococcal Conjugate - 13 Valent 01/09/2018    Pneumococcal Polysaccharide - 23 Valent 10/30/2002, 10/02/2019    Zoster 12/11/2012    Zoster Recombinant 06/07/2018, 08/28/2018       Patient Care Team:  Donnie Das Sr., MD as PCP - General (Family Medicine)  Hardeep Ochoa FNP (Nurse Practitioner)  Andrey Borja MD as Consulting Physician (Gastroenterology)  Jourdan Flanagan MD as Consulting Physician (Pulmonary Disease)  Maurice Mcmanus OD (Ophthalmology)  Dakotah Wheat MD as Consulting Physician (Urology)  Hardeep Ochoa FNP (Nurse Practitioner)     Subjective:     Review of Systems    10 point review of systems conducted,  "negative except as stated in the history of present illness. See HPI for details.    Objective:     Visit Vitals  /70 (BP Location: Right arm, Patient Position: Sitting, BP Method: Medium (Manual))   Pulse 62   Resp 20   Ht 5' 8" (1.727 m)   Wt 111.1 kg (245 lb)   SpO2 95%   BMI 37.25 kg/m²       Physical Exam  Constitutional:       Appearance: He is obese.   HENT:      Head: Normocephalic and atraumatic.   Cardiovascular:      Rate and Rhythm: Normal rate and regular rhythm.   Pulmonary:      Effort: Pulmonary effort is normal.      Breath sounds: Normal breath sounds.   Abdominal:      Palpations: Abdomen is soft.      Tenderness: There is no abdominal tenderness.   Musculoskeletal:         General: No swelling or tenderness. Normal range of motion.      Cervical back: Normal range of motion and neck supple.      Right lower leg: No edema.      Left lower leg: No edema.   Lymphadenopathy:      Cervical: No cervical adenopathy.   Skin:     General: Skin is warm and dry.   Neurological:      General: No focal deficit present.      Mental Status: He is alert and oriented to person, place, and time.   Psychiatric:         Mood and Affect: Mood normal.         Assessment:       ICD-10-CM ICD-9-CM   1. Type 2 diabetes mellitus without complication, without long-term current use of insulin  E11.9 250.00   2. Primary hypertension  I10 401.9   3. Mixed hyperlipidemia  E78.2 272.2   4. Class 2 severe obesity due to excess calories with serious comorbidity and body mass index (BMI) of 37.0 to 37.9 in adult  E66.01 278.01    Z68.37 V85.37   5. DAWSON (obstructive sleep apnea)  G47.33 327.23       Plan:   1. Type 2 diabetes mellitus without complication, without long-term current use of insulin  Overview:  Continue present med tx.  Glimepiride 2 mg b.i.d.  ,  Janumet 50/1000 1 b.i.d., Jardiance 25 mg daily.                                                         Follow ADA Diet. Avoid soda, simple sweets, and limit " rice/pasta/breads/starches (no more than 45-50 grams per meal).  Maintain healthy weight with goal BMI <30.  Exercise 5 times per week for 30 minutes per day.  Stressed importance of daily foot exams especially if neuropathy is present.  Patient was instructed to notify physician if they notice any sores or skin lesions on the feet.  Stressed the importance of wearing proper fitting comfortable shoes i.e. diabetic footwear.  They were instructed to always wear shoes and never go barefooted.  Stressed importance of annual dilated eye exam.          Assessment & Plan:  Patient's last A1c was done in the office today and it was 7.3.  I had a discussion with the patient regarding the new GLP agonist injections and he was amenable to trying 1 of these.  We will issue a prescription for Mounjaro 2.5 mg subQ weekly.  We will continue to monitor    Orders:  -     POCT Microalbumin/Creatinine Ratio  -     POCT urine dipstick without microscope  -     Hemoglobin A1C, POCT    2. Primary hypertension  Overview:  Continue present med tx.  Metoprolol 50 mg daily, lisinopril 10 mg daily, furosemide 20 mg q.a.m. HCTZ 12.5 mg daily.      Low Sodium Diet (DASH Diet - Less than 2 grams of sodium per day).  Monitor blood pressure daily and log. Report consistent numbers greater than 140/90.  Maintain healthy weight with goal BMI <30. Exercise 30 minutes per day, 5 days per week.      3. Mixed hyperlipidemia  Overview:  Current medication Vytorin 10-40 daily.   Stressed importance of dietary modifications. Follow a low cholesterol, low saturated fat diet with less that 200mg of cholesterol a day.  Avoid fried foods and high saturated fats (high saturated fats less than 7% of calories).  Add Flax Seed/Fish Oil supplements to diet. Increase dietary fiber.  Regular exercise can reduce LDL and raise HDL. Stressed importance of physical activity 5 times per week for 30 minutes per day.     Assessment & Plan:  Patient is due for a repeat  lipid panel.  We will schedule him to come in for lab.      4. Class 2 severe obesity due to excess calories with serious comorbidity and body mass index (BMI) of 37.0 to 37.9 in adult  Overview:  Body mass index is 37.22 kg/m².  Goal BMI <30.  Exercise 5 times a week for 30 minutes per day.  Avoid soda, simple sugars, excessive rice, potatoes or bread. Limit fast foods and fried foods.  Choose complex carbs in moderation (example: green vegetables, beans, oatmeal). Eat plenty of fresh fruits and vegetables with lean meats daily.  Do not skip meals. Eat a balanced portion size.  Avoid fad diets. Consider permanent healthy life style changes.     Assessment & Plan:  We plan on starting the patient on 1 of the new GLP agonist injections such as Mounjaro.  In addition to improving his A1c, hopefully it will help him accomplish some weight loss.      5. DAWSON (obstructive sleep apnea)  Overview:  Wears CPAP/BiPAP and reports compliance nightly. Life time use expected.  Has experienced improved daytime fatigue.  Avoid excessive alcohol, smoking and overuse of sedatives at bedtime.  Weight management discussed. Goal BMI <30.  Adjust sleep position PRN.          [x] Discussed lab findings with the patient.  [x] Discussed diet, exercise and if appropriate, weight loss.  [x] Instructions and information, including risks and benefits of prescribed medication(s) have been reviewed with the patient and patient verbalizes understanding. Questions have been answered to the patient's satisfaction.  [] Appropriate counseling has been given regarding anxiety and depressive issues that were discussed today.  [] Any lab drawn today will be reviewed by physician at the time it is received and appropriate recommendations bill be made and discussed with patient.     Follow up in about 3 months (around 6/4/2024).   In addition to their scheduled follow up, the patient has also been instructed to follow up on as needed basis.     Future  Appointments   Date Time Provider Department Center   6/11/2024  1:00 PM Donnie Das Sr., MD [facility]        Donnie Das Sr, MD

## 2024-03-04 NOTE — ASSESSMENT & PLAN NOTE
We plan on starting the patient on 1 of the new GLP agonist injections such as Mounjaro.  In addition to improving his A1c, hopefully it will help him accomplish some weight loss.

## 2024-03-11 ENCOUNTER — TELEPHONE (OUTPATIENT)
Dept: FAMILY MEDICINE | Facility: CLINIC | Age: 74
End: 2024-03-11
Payer: MEDICARE

## 2024-03-11 NOTE — TELEPHONE ENCOUNTER
Spoke with Rocael and Mounjaro approved .$45.00 copay. He will come in probably 3/12/24 for his first self-administration  . Patient verbalized an understanding.

## 2024-03-12 ENCOUNTER — TELEPHONE (OUTPATIENT)
Dept: FAMILY MEDICINE | Facility: CLINIC | Age: 74
End: 2024-03-12
Payer: MEDICARE

## 2024-03-12 NOTE — TELEPHONE ENCOUNTER
"Rocael instructed on his first self administration of Mounjaro 2.5 mg  once weekly for 1 month then increase to 5 mg once weekly, Dr Das new orders regarding his Amaryl 4 mg  1/2 q a.m and 1 q hs, new orders after about 3 weeks patient can stop the morning dose , in about 5 weeks patient can stop the evening dose, If his CBG's start running in the 130"s  for about 5 days consecutively he was instructed to just stop the Amaryl . Patient will call with CBG readings in about 2 months and has RTC appointment on 6/11/24 .Patient verbalized an understanding.  "

## 2024-03-27 ENCOUNTER — TELEPHONE (OUTPATIENT)
Dept: FAMILY MEDICINE | Facility: CLINIC | Age: 74
End: 2024-03-27
Payer: MEDICARE

## 2024-03-27 NOTE — TELEPHONE ENCOUNTER
Patient notified that I can send another Rx in but his insurance will probably not pay for testing BID, He states his sugar was 150 and wanted to check it again to see if it went down. It is down in the 130's now. (Sugar Level)Patient verbalized an understanding.

## 2024-03-27 NOTE — TELEPHONE ENCOUNTER
----- Message from Daniela Schultz sent at 3/27/2024  1:25 PM CDT -----  Rocael states he checks his blood sugar more than once a day when it is running high.  The directions for One Touch Ultra Strips and lancets are one a day.  He will run out before he can reorder.  Can it be refilled with different directions to allow for multiple testing a day?

## 2024-04-02 ENCOUNTER — TELEPHONE (OUTPATIENT)
Dept: FAMILY MEDICINE | Facility: CLINIC | Age: 74
End: 2024-04-02
Payer: MEDICARE

## 2024-04-02 NOTE — TELEPHONE ENCOUNTER
Rocael notified that his Mounjaro 5 mg Rx is already at [facility].Patient verbalized an understanding.

## 2024-04-02 NOTE — TELEPHONE ENCOUNTER
----- Message from Daniela Schultz sent at 4/2/2024  3:20 PM CDT -----  Rocael states he has taken the last of his monjaro.  Also the dosage is 5.0  [place].

## 2024-04-04 ENCOUNTER — TELEPHONE (OUTPATIENT)
Dept: FAMILY MEDICINE | Facility: CLINIC | Age: 74
End: 2024-04-04
Payer: MEDICARE

## 2024-04-04 NOTE — TELEPHONE ENCOUNTER
----- Message from Dolores Carson sent at 4/3/2024 10:43 AM CDT -----  HE HAS BEEN ALL OVER AND NO ONE HAS MOUNJARO 2.5 OR 5.  WM JENKINS JUST GOT DEIRDRE 10.  HE DOES NOT KNOW WHAT TO DO

## 2024-04-04 NOTE — TELEPHONE ENCOUNTER
Called and spoke with Rocael he was able to get his medication at [facility] in [place].

## 2024-04-17 ENCOUNTER — TELEPHONE (OUTPATIENT)
Dept: FAMILY MEDICINE | Facility: CLINIC | Age: 74
End: 2024-04-17
Payer: MEDICARE

## 2024-04-17 DIAGNOSIS — E11.9 TYPE 2 DIABETES MELLITUS WITHOUT COMPLICATION, WITHOUT LONG-TERM CURRENT USE OF INSULIN: Primary | ICD-10-CM

## 2024-04-17 NOTE — TELEPHONE ENCOUNTER
Patient called stating he has 2 weeks left of his 5 mg Mounjaro and was wondering if you wanted him to titrate up to the 7.5 mg or stay at the 5 mg.    Last appointment was 3/4/24.  Last A1C (3/6/24) was 7.6

## 2024-04-30 DIAGNOSIS — E03.9 HYPOTHYROIDISM, UNSPECIFIED TYPE: ICD-10-CM

## 2024-04-30 DIAGNOSIS — T78.40XS ALLERGY, SEQUELA: ICD-10-CM

## 2024-04-30 RX ORDER — LEVOTHYROXINE SODIUM 75 UG/1
75 TABLET ORAL EVERY MORNING
Qty: 90 TABLET | Refills: 1 | Status: SHIPPED | OUTPATIENT
Start: 2024-04-30

## 2024-04-30 RX ORDER — MONTELUKAST SODIUM 10 MG/1
10 TABLET ORAL DAILY
Qty: 90 TABLET | Refills: 1 | Status: SHIPPED | OUTPATIENT
Start: 2024-04-30

## 2024-05-02 NOTE — TELEPHONE ENCOUNTER
----- Message from Dolores Carson sent at 5/1/2024 12:45 PM CDT -----  He needs his Furosemide 20mg called in to [facility] and his Mounjaro 7.5 is unavailable so he asked his pharmacist to keep giving him Mounjaro 5

## 2024-05-02 NOTE — TELEPHONE ENCOUNTER
Spoke with patient and he should have refills of Lasix already at [facility], He will check .

## 2024-05-17 ENCOUNTER — TELEPHONE (OUTPATIENT)
Dept: FAMILY MEDICINE | Facility: CLINIC | Age: 74
End: 2024-05-17
Payer: MEDICARE

## 2024-05-17 NOTE — TELEPHONE ENCOUNTER
----- Message from Daniela Schultz sent at 5/14/2024  1:07 PM CDT -----  He received a letter from [employer] regarding mounjaro 7.5 mg.  Can someone return his call.    [employer] phone [phone]

## 2024-05-23 ENCOUNTER — TELEPHONE (OUTPATIENT)
Dept: FAMILY MEDICINE | Facility: CLINIC | Age: 74
End: 2024-05-23
Payer: MEDICARE

## 2024-05-23 DIAGNOSIS — I10 PRIMARY HYPERTENSION: ICD-10-CM

## 2024-05-23 DIAGNOSIS — E11.9 TYPE 2 DIABETES MELLITUS WITHOUT COMPLICATION, WITHOUT LONG-TERM CURRENT USE OF INSULIN: Primary | ICD-10-CM

## 2024-05-23 RX ORDER — SITAGLIPTIN AND METFORMIN HYDROCHLORIDE 1000; 50 MG/1; MG/1
1 TABLET, FILM COATED ORAL 2 TIMES DAILY
Qty: 180 TABLET | Refills: 1 | Status: SHIPPED | OUTPATIENT
Start: 2024-05-23

## 2024-05-23 RX ORDER — METOPROLOL SUCCINATE 50 MG/1
50 TABLET, EXTENDED RELEASE ORAL DAILY
Qty: 90 TABLET | Refills: 1 | Status: SHIPPED | OUTPATIENT
Start: 2024-05-23

## 2024-05-23 RX ORDER — EMPAGLIFLOZIN 25 MG/1
25 TABLET, FILM COATED ORAL DAILY
Qty: 90 TABLET | Refills: 1 | Status: SHIPPED | OUTPATIENT
Start: 2024-05-23

## 2024-05-23 RX ORDER — FUROSEMIDE 20 MG/1
20 TABLET ORAL EVERY MORNING
Qty: 90 TABLET | Refills: 1 | Status: SHIPPED | OUTPATIENT
Start: 2024-05-23

## 2024-05-23 NOTE — TELEPHONE ENCOUNTER
----- Message from Dolores Carson sent at 5/23/2024 11:06 AM CDT -----  He needs Janumet and Jardiance called to [facility] and Metoprolol and Furosomide switched to [facility]

## 2024-05-28 ENCOUNTER — TELEPHONE (OUTPATIENT)
Dept: FAMILY MEDICINE | Facility: CLINIC | Age: 74
End: 2024-05-28
Payer: MEDICARE

## 2024-05-28 DIAGNOSIS — E11.9 TYPE 2 DIABETES MELLITUS WITHOUT COMPLICATION, WITHOUT LONG-TERM CURRENT USE OF INSULIN: Primary | ICD-10-CM

## 2024-05-28 RX ORDER — INSULIN PUMP SYRINGE, 3 ML
EACH MISCELLANEOUS
Qty: 1 EACH | Refills: 0 | Status: SHIPPED | OUTPATIENT
Start: 2024-05-28 | End: 2025-05-28

## 2024-05-28 RX ORDER — LANCETS 33 GAUGE
EACH MISCELLANEOUS
Qty: 100 EACH | Refills: 11 | Status: SHIPPED | OUTPATIENT
Start: 2024-05-28

## 2024-05-28 NOTE — TELEPHONE ENCOUNTER
----- Message from Dolores Carson sent at 5/28/2024 10:56 AM CDT -----  His glucometer won't always cock for him, he said it is old.  He would like to get another 1 and he will need supplies for it called to [facility]

## 2024-06-06 DIAGNOSIS — E11.9 TYPE 2 DIABETES MELLITUS WITHOUT COMPLICATION, WITHOUT LONG-TERM CURRENT USE OF INSULIN: ICD-10-CM

## 2024-06-06 RX ORDER — GLIMEPIRIDE 4 MG/1
TABLET ORAL
Qty: 135 TABLET | Refills: 0 | Status: SHIPPED | OUTPATIENT
Start: 2024-06-06 | End: 2024-06-11

## 2024-06-07 DIAGNOSIS — I10 PRIMARY HYPERTENSION: Primary | ICD-10-CM

## 2024-06-07 DIAGNOSIS — E78.2 MIXED HYPERLIPIDEMIA: ICD-10-CM

## 2024-06-07 DIAGNOSIS — K21.9 GASTROESOPHAGEAL REFLUX DISEASE, UNSPECIFIED WHETHER ESOPHAGITIS PRESENT: ICD-10-CM

## 2024-06-07 RX ORDER — HYDROCHLOROTHIAZIDE 12.5 MG/1
12.5 TABLET ORAL DAILY
Qty: 30 TABLET | Refills: 2 | Status: SHIPPED | OUTPATIENT
Start: 2024-06-07 | End: 2024-06-11

## 2024-06-07 RX ORDER — FENOFIBRATE 134 MG/1
134 CAPSULE ORAL
Qty: 30 CAPSULE | Refills: 2 | Status: SHIPPED | OUTPATIENT
Start: 2024-06-07

## 2024-06-07 RX ORDER — OMEPRAZOLE 40 MG/1
40 CAPSULE, DELAYED RELEASE ORAL EVERY MORNING
Qty: 30 CAPSULE | Refills: 2 | Status: SHIPPED | OUTPATIENT
Start: 2024-06-07

## 2024-06-07 RX ORDER — FENOFIBRATE 134 MG/1
134 CAPSULE ORAL
COMMUNITY
Start: 2024-03-13 | End: 2024-06-07 | Stop reason: SDUPTHER

## 2024-06-07 RX ORDER — LISINOPRIL 10 MG/1
10 TABLET ORAL DAILY
Qty: 30 TABLET | Refills: 2 | Status: SHIPPED | OUTPATIENT
Start: 2024-06-07

## 2024-06-11 ENCOUNTER — OFFICE VISIT (OUTPATIENT)
Dept: FAMILY MEDICINE | Facility: CLINIC | Age: 74
End: 2024-06-11
Payer: MEDICARE

## 2024-06-11 VITALS
WEIGHT: 225.38 LBS | RESPIRATION RATE: 18 BRPM | SYSTOLIC BLOOD PRESSURE: 116 MMHG | OXYGEN SATURATION: 94 % | HEART RATE: 64 BPM | BODY MASS INDEX: 34.16 KG/M2 | HEIGHT: 68 IN | DIASTOLIC BLOOD PRESSURE: 68 MMHG | TEMPERATURE: 99 F

## 2024-06-11 DIAGNOSIS — E78.2 MIXED HYPERLIPIDEMIA: ICD-10-CM

## 2024-06-11 DIAGNOSIS — E11.9 TYPE 2 DIABETES MELLITUS WITHOUT COMPLICATION, WITHOUT LONG-TERM CURRENT USE OF INSULIN: Primary | ICD-10-CM

## 2024-06-11 DIAGNOSIS — I10 PRIMARY HYPERTENSION: ICD-10-CM

## 2024-06-11 PROCEDURE — 3078F DIAST BP <80 MM HG: CPT | Mod: CPTII,,, | Performed by: NURSE PRACTITIONER

## 2024-06-11 PROCEDURE — 1159F MED LIST DOCD IN RCRD: CPT | Mod: CPTII,,, | Performed by: NURSE PRACTITIONER

## 2024-06-11 PROCEDURE — 3061F NEG MICROALBUMINURIA REV: CPT | Mod: CPTII,,, | Performed by: NURSE PRACTITIONER

## 2024-06-11 PROCEDURE — 3051F HG A1C>EQUAL 7.0%<8.0%: CPT | Mod: CPTII,,, | Performed by: NURSE PRACTITIONER

## 2024-06-11 PROCEDURE — 3066F NEPHROPATHY DOC TX: CPT | Mod: CPTII,,, | Performed by: NURSE PRACTITIONER

## 2024-06-11 PROCEDURE — 1101F PT FALLS ASSESS-DOCD LE1/YR: CPT | Mod: CPTII,,, | Performed by: NURSE PRACTITIONER

## 2024-06-11 PROCEDURE — 1160F RVW MEDS BY RX/DR IN RCRD: CPT | Mod: CPTII,,, | Performed by: NURSE PRACTITIONER

## 2024-06-11 PROCEDURE — 99214 OFFICE O/P EST MOD 30 MIN: CPT | Mod: ,,, | Performed by: NURSE PRACTITIONER

## 2024-06-11 PROCEDURE — 3074F SYST BP LT 130 MM HG: CPT | Mod: CPTII,,, | Performed by: NURSE PRACTITIONER

## 2024-06-11 PROCEDURE — 3008F BODY MASS INDEX DOCD: CPT | Mod: CPTII,,, | Performed by: NURSE PRACTITIONER

## 2024-06-11 PROCEDURE — 4010F ACE/ARB THERAPY RXD/TAKEN: CPT | Mod: CPTII,,, | Performed by: NURSE PRACTITIONER

## 2024-06-11 PROCEDURE — 3288F FALL RISK ASSESSMENT DOCD: CPT | Mod: CPTII,,, | Performed by: NURSE PRACTITIONER

## 2024-06-11 NOTE — PROGRESS NOTES
Internal Medicine    Patient ID: [id number]     Chief Complaint: Results ((Patient here to discuss lab results.))    HPI:     Rocael Das is a 73 y.o. male here today for a follow up. Started on Mounjaro at last visit. Tolerating well without adverse effects. Mild reduction in HA1C. Amenable to increasing dose. No other complaints today.     Past Medical History:   Diagnosis Date    Depression     Diabetes mellitus, type 2     GERD (gastroesophageal reflux disease)     H/O prostate cancer     Hypertension     Hypothyroidism     Insomnia     Mixed hyperlipidemia     Obese     DAWSON (obstructive sleep apnea)     Vitamin D deficiency         Past Surgical History:   Procedure Laterality Date    COLONOSCOPY  2022    Dr Borja  Repeat 2 years    CRYOTHERAPY  2013    PROSTATE SURGERY  2013        Social History     Tobacco Use    Smoking status: Former     Current packs/day: 0.00     Types: Cigarettes     Quit date:      Years since quittin.4    Smokeless tobacco: Never   Substance and Sexual Activity    Alcohol use: Not on file     Comment: holidays    Drug use: Never    Sexual activity: Not on file        Current Outpatient Medications   Medication Instructions    ALPRAZolam (XANAX) 0.5 mg, Oral, 2 times daily PRN    ascorbic acid (vitamin C) (VITAMIN C) 250 mg, Oral, Daily    aspirin (ECOTRIN) 81 mg, Oral, Daily    blood sugar diagnostic (ONETOUCH ULTRA TEST) Strp Use to test blood sugar once daily    blood-glucose meter kit Use as instructed    buPROPion (WELLBUTRIN XL) 150 mg, Oral, Every morning    ezetimibe-simvastatin 10-40 mg (VYTORIN) 10-40 mg per tablet 1 tablet, Oral, Nightly    fenofibrate micronized (LOFIBRA) 134 mg, Oral, With breakfast    ferrous sulfate (IRON ORAL) 65 mg, Oral, Daily    FLUoxetine 40 mg, Oral, Every morning    furosemide (LASIX) 20 mg, Oral, Every morning    glucosamine chauhan 2KCl-chondroit 750-600 mg Tab 1 tablet, Oral, Nightly    JANUMET 50-1,000 mg per tablet 1  "tablet, Oral, 2 times daily    JARDIANCE 25 mg, Oral, Daily    L.acidophil/L.plantar/Bifido 7 (UP4 PROBIOTICS ADULT ORAL) Oral, Every Mon, Wed, Fri    levothyroxine (SYNTHROID) 75 mcg, Oral, Every morning    lisinopriL 10 mg, Oral, Daily    mesalamine (LIALDA) 4.8 g, Oral, With breakfast    metoprolol succinate (TOPROL-XL) 50 mg, Oral, Daily    montelukast (SINGULAIR) 10 mg, Oral, Daily    omeprazole (PRILOSEC) 40 mg, Oral, Every morning    ONETOUCH DELICA PLUS LANCET 33 gauge Misc SMARTSIG:Via Meter Daily    oxybutynin (DITROPAN) 5 mg, Oral, 2 times daily    tirzepatide 10 mg, Subcutaneous, Every 7 days    vitamin D (VITAMIN D3) 5,000 Units, Oral, Daily    zolpidem (AMBIEN) 10 mg, Oral, Nightly       Review of patient's allergies indicates:   Allergen Reactions    Balsalazide Other (See Comments)    Macrolide antibiotics     Pcn [penicillins]     Erythromycin Rash        Patient Care Team:  Donnie Das Sr., MD as PCP - General (Family Medicine)  Hardeep Ochoa FNP (Nurse Practitioner)  Andrey Borja MD as Consulting Physician (Gastroenterology)  Jourdan Flanagan MD as Consulting Physician (Pulmonary Disease)  Maurice Mcmanus OD (Ophthalmology)  Dakotah Wheat MD as Consulting Physician (Urology)  Hardeep Ochoa FNP (Nurse Practitioner)     Subjective:     Review of Systems    12 point review of systems conducted, negative except as stated in the history of present illness. See HPI for details.    Objective:     Visit Vitals  /68   Pulse 64   Temp 98.7 °F (37.1 °C)   Resp 18   Ht 5' 8" (1.727 m)   Wt 102.2 kg (225 lb 6.4 oz)   SpO2 (!) 94%   BMI 34.27 kg/m²       Physical Exam  Constitutional:       Appearance: He is obese.   HENT:      Head: Normocephalic and atraumatic.   Cardiovascular:      Rate and Rhythm: Normal rate and regular rhythm.   Pulmonary:      Effort: Pulmonary effort is normal.      Breath sounds: Normal breath sounds.   Abdominal:      Palpations: Abdomen is soft. "      Tenderness: There is no abdominal tenderness.   Musculoskeletal:         General: No swelling or tenderness. Normal range of motion.      Cervical back: Normal range of motion and neck supple.      Right lower leg: No edema.      Left lower leg: No edema.   Skin:     General: Skin is warm and dry.   Neurological:      General: No focal deficit present.      Mental Status: He is alert and oriented to person, place, and time.   Psychiatric:         Mood and Affect: Mood normal.         Labs Reviewed:     Chemistry:  Lab Results   Component Value Date     03/06/2024    K 4.2 03/06/2024    BUN 17 03/06/2024    CREATININE 0.93 03/06/2024    EGFRNORACEVR 87 03/06/2024    CALCIUM 9.3 03/06/2024    ALBUMIN 4.5 03/06/2024    AST 18 03/06/2024    ALT 19 03/06/2024    TSH 1.580 03/06/2024        Lab Results   Component Value Date    HGBA1C 7.1 (H) 06/06/2024        Hematology:  Lab Results   Component Value Date    WBC 8.1 03/06/2024    HGB 13.3 03/06/2024    HCT 41.2 03/06/2024     03/06/2024       Lipid Panel:  Lab Results   Component Value Date    CHOL 138 03/06/2024    HDL 39 (L) 03/06/2024    TRIG 192 (H) 03/06/2024    LDLCALC 67 03/06/2024        Assessment:       ICD-10-CM ICD-9-CM   1. Type 2 diabetes mellitus without complication, without long-term current use of insulin  E11.9 250.00   2. Primary hypertension  I10 401.9   3. Mixed hyperlipidemia  E78.2 272.2        Plan:     1. Type 2 diabetes mellitus without complication, without long-term current use of insulin  Overview:  Lab Results   Component Value Date    HGBA1C 7.1 (H) 06/06/2024    HGBA1C 7.6 (H) 03/06/2024    HGBA1C 7.2 (A) 05/10/2023    HGBA1C 7.2 (A) 05/10/2023    CREATININE 0.93 03/06/2024      Diabetes Medications               JANUMET 50-1,000 mg per tablet Take 1 tablet by mouth 2 (two) times daily.    JARDIANCE 25 mg tablet Take 1 tablet (25 mg total) by mouth once daily.    tirzepatide 10 mg/0.5 mL PnIj Inject 10 mg into the skin  every 7 days.   Increase Mounjaro to 10mg weekly.  If glucose drops below 120s, but Janumet in half with goal to discontinue.  On ACE and Statin according to guidelines.  Discussed caution with SGLT2s + diuretics as concomitant use can cause volume depletion.   Follow ADA Diet. Avoid soda, simple sweets, and limit rice/pasta/breads/starches (no more than 45-50 grams per meal).  Maintain healthy weight with goal BMI <30.  Exercise 5 times per week for 30 minutes per day.  Stressed importance of daily foot exams.  Stressed importance of annual dilated eye exam.      Orders:  -     tirzepatide 10 mg/0.5 mL PnIj; Inject 10 mg into the skin every 7 days.  Dispense: 4 Pen; Refill: 5    2. Primary hypertension  Overview:  Continue present med tx.  Metoprolol 50 mg daily, lisinopril 10 mg daily, furosemide 20 mg q.a.m. HCTZ 12.5 mg daily.      Low Sodium Diet (DASH Diet - Less than 2 grams of sodium per day).  Monitor blood pressure daily and log. Report consistent numbers greater than 140/90.  Maintain healthy weight with goal BMI <30. Exercise 30 minutes per day, 5 days per week.      3. Mixed hyperlipidemia  Overview:  Lab Results   Component Value Date    TRIG 192 (H) 03/06/2024    HDL 39 (L) 03/06/2024    LDLCALC 67 03/06/2024     Hyperlipidemia Medications               ezetimibe-simvastatin 10-40 mg (VYTORIN) 10-40 mg per tablet Take 1 tablet by mouth every evening.    fenofibrate micronized (LOFIBRA) 134 MG Cap Take 1 capsule (134 mg total) by mouth daily with breakfast.     Stressed importance of dietary modifications. Follow a low cholesterol, low saturated fat diet with less that 200mg of cholesterol a day.  Avoid fried foods and high saturated fats (high saturated fats less than 7% of calories).  Add Flax Seed/Fish Oil supplements to diet. Increase dietary fiber.  Regular exercise can reduce LDL and raise HDL. Stressed importance of physical activity 5 times per week for 30 minutes per day.            Follow up  in about 3 months (around 9/11/2024) for Diabetes Follow Up. In addition to their scheduled follow up, the patient has also been instructed to follow up on as needed basis.     Future Appointments   Date Time Provider Department Center   9/5/2024 10:00 AM LAB, [facility]   9/9/2024  1:00 PM PROVIDER, [facility]

## 2024-07-08 ENCOUNTER — TELEPHONE (OUTPATIENT)
Dept: FAMILY MEDICINE | Facility: CLINIC | Age: 74
End: 2024-07-08
Payer: MEDICARE

## 2024-07-23 ENCOUNTER — TELEPHONE (OUTPATIENT)
Dept: FAMILY MEDICINE | Facility: CLINIC | Age: 74
End: 2024-07-23
Payer: MEDICARE

## 2024-07-23 NOTE — TELEPHONE ENCOUNTER
Called and spoke with Rocael and notified him that he was just increase on 6/11/2024 at his time of his appointment and he has refills for the 10 mg at his pharmacy. He is scheduled September for repeat A1C and visit . At that time the doctor will decide if and when to increase his dose.

## 2024-09-03 ENCOUNTER — TELEPHONE (OUTPATIENT)
Dept: FAMILY MEDICINE | Facility: CLINIC | Age: 74
End: 2024-09-03
Payer: MEDICARE

## 2024-09-03 DIAGNOSIS — E78.2 MIXED HYPERLIPIDEMIA: ICD-10-CM

## 2024-09-03 RX ORDER — FENOFIBRATE 134 MG/1
134 CAPSULE ORAL
Qty: 30 CAPSULE | Refills: 1 | Status: SHIPPED | OUTPATIENT
Start: 2024-09-03

## 2024-09-05 DIAGNOSIS — I10 PRIMARY HYPERTENSION: ICD-10-CM

## 2024-09-05 DIAGNOSIS — K21.9 GASTROESOPHAGEAL REFLUX DISEASE, UNSPECIFIED WHETHER ESOPHAGITIS PRESENT: ICD-10-CM

## 2024-09-05 RX ORDER — OMEPRAZOLE 40 MG/1
40 CAPSULE, DELAYED RELEASE ORAL EVERY MORNING
Qty: 30 CAPSULE | Refills: 0 | Status: SHIPPED | OUTPATIENT
Start: 2024-09-05

## 2024-09-05 RX ORDER — LISINOPRIL 10 MG/1
10 TABLET ORAL DAILY
Qty: 30 TABLET | Refills: 2 | Status: SHIPPED | OUTPATIENT
Start: 2024-09-05

## 2024-09-26 ENCOUNTER — OFFICE VISIT (OUTPATIENT)
Dept: FAMILY MEDICINE | Facility: CLINIC | Age: 74
End: 2024-09-26
Payer: MEDICARE

## 2024-09-26 VITALS
HEIGHT: 68 IN | TEMPERATURE: 98 F | HEART RATE: 56 BPM | RESPIRATION RATE: 18 BRPM | BODY MASS INDEX: 31.58 KG/M2 | DIASTOLIC BLOOD PRESSURE: 63 MMHG | WEIGHT: 208.38 LBS | SYSTOLIC BLOOD PRESSURE: 124 MMHG | OXYGEN SATURATION: 96 %

## 2024-09-26 DIAGNOSIS — I10 PRIMARY HYPERTENSION: ICD-10-CM

## 2024-09-26 DIAGNOSIS — E11.9 TYPE 2 DIABETES MELLITUS WITHOUT COMPLICATION, WITHOUT LONG-TERM CURRENT USE OF INSULIN: ICD-10-CM

## 2024-09-26 DIAGNOSIS — Z23 NEED FOR INFLUENZA VACCINATION: Primary | ICD-10-CM

## 2024-09-26 DIAGNOSIS — Z13.6 ENCOUNTER FOR ABDOMINAL AORTIC ANEURYSM (AAA) SCREENING: ICD-10-CM

## 2024-09-26 NOTE — ASSESSMENT & PLAN NOTE
Patient would like to increase his Mounjaro to 12.5 to assist with achieving his weight loss goal.  We will also discontinue Janumet to avoid hypoglycemia, since his A1c dropped to 6.4.

## 2024-10-15 ENCOUNTER — HOSPITAL ENCOUNTER (OUTPATIENT)
Dept: RADIOLOGY | Facility: HOSPITAL | Age: 74
Discharge: HOME | End: 2024-10-15
Payer: MEDICARE

## 2024-10-15 DIAGNOSIS — Z13.6 ENCOUNTER FOR ABDOMINAL AORTIC ANEURYSM (AAA) SCREENING: ICD-10-CM

## 2024-10-15 DIAGNOSIS — K21.9 GASTROESOPHAGEAL REFLUX DISEASE, UNSPECIFIED WHETHER ESOPHAGITIS PRESENT: ICD-10-CM

## 2024-10-15 PROCEDURE — 76775 US EXAM ABDO BACK WALL LIM: CPT | Mod: TC

## 2024-10-15 RX ORDER — OMEPRAZOLE 40 MG/1
40 CAPSULE, DELAYED RELEASE ORAL EVERY MORNING
Qty: 30 CAPSULE | Refills: 1 | Status: SHIPPED | OUTPATIENT
Start: 2024-10-15

## 2024-10-21 DIAGNOSIS — T78.40XS ALLERGY, SEQUELA: ICD-10-CM

## 2024-10-21 DIAGNOSIS — E03.9 HYPOTHYROIDISM, UNSPECIFIED TYPE: ICD-10-CM

## 2024-10-21 RX ORDER — MONTELUKAST SODIUM 10 MG/1
10 TABLET ORAL DAILY
Qty: 90 TABLET | Refills: 1 | Status: SHIPPED | OUTPATIENT
Start: 2024-10-21

## 2024-10-21 RX ORDER — LEVOTHYROXINE SODIUM 75 UG/1
75 TABLET ORAL EVERY MORNING
Qty: 90 TABLET | Refills: 1 | Status: SHIPPED | OUTPATIENT
Start: 2024-10-21

## 2024-11-05 DIAGNOSIS — E78.2 MIXED HYPERLIPIDEMIA: ICD-10-CM

## 2024-11-05 RX ORDER — FENOFIBRATE 134 MG/1
134 CAPSULE ORAL
Qty: 30 CAPSULE | Refills: 1 | Status: SHIPPED | OUTPATIENT
Start: 2024-11-05

## 2024-11-21 DIAGNOSIS — E11.9 TYPE 2 DIABETES MELLITUS WITHOUT COMPLICATION, WITHOUT LONG-TERM CURRENT USE OF INSULIN: ICD-10-CM

## 2024-11-21 DIAGNOSIS — I10 PRIMARY HYPERTENSION: ICD-10-CM

## 2024-11-21 RX ORDER — METOPROLOL SUCCINATE 50 MG/1
50 TABLET, EXTENDED RELEASE ORAL DAILY
Qty: 90 TABLET | Refills: 1 | Status: SHIPPED | OUTPATIENT
Start: 2024-11-21

## 2024-12-06 DIAGNOSIS — I10 PRIMARY HYPERTENSION: ICD-10-CM

## 2024-12-06 RX ORDER — LISINOPRIL 10 MG/1
10 TABLET ORAL DAILY
Qty: 30 TABLET | Refills: 2 | Status: SHIPPED | OUTPATIENT
Start: 2024-12-06

## 2024-12-16 LAB
LEFT EYE DM RETINOPATHY: NEGATIVE
RIGHT EYE DM RETINOPATHY: NEGATIVE

## 2024-12-20 DIAGNOSIS — K21.9 GASTROESOPHAGEAL REFLUX DISEASE, UNSPECIFIED WHETHER ESOPHAGITIS PRESENT: ICD-10-CM

## 2024-12-20 RX ORDER — OMEPRAZOLE 40 MG/1
40 CAPSULE, DELAYED RELEASE ORAL EVERY MORNING
Qty: 30 CAPSULE | Refills: 1 | Status: SHIPPED | OUTPATIENT
Start: 2024-12-20

## 2024-12-30 ENCOUNTER — DOCUMENTATION ONLY (OUTPATIENT)
Dept: FAMILY MEDICINE | Facility: CLINIC | Age: 74
End: 2024-12-30
Payer: MEDICARE

## 2025-01-02 DIAGNOSIS — E78.2 MIXED HYPERLIPIDEMIA: ICD-10-CM

## 2025-01-02 RX ORDER — FENOFIBRATE 134 MG/1
134 CAPSULE ORAL
Qty: 30 CAPSULE | Refills: 1 | Status: SHIPPED | OUTPATIENT
Start: 2025-01-02

## 2025-02-11 DIAGNOSIS — K21.9 GASTROESOPHAGEAL REFLUX DISEASE, UNSPECIFIED WHETHER ESOPHAGITIS PRESENT: ICD-10-CM

## 2025-02-11 RX ORDER — OMEPRAZOLE 40 MG/1
40 CAPSULE, DELAYED RELEASE ORAL EVERY MORNING
Qty: 30 CAPSULE | Refills: 1 | Status: SHIPPED | OUTPATIENT
Start: 2025-02-11

## 2025-02-18 DIAGNOSIS — E78.2 MIXED HYPERLIPIDEMIA: ICD-10-CM

## 2025-02-18 RX ORDER — EZETIMIBE AND SIMVASTATIN 10; 40 MG/1; MG/1
0.5 TABLET ORAL NIGHTLY
Qty: 15 TABLET | Refills: 2 | Status: SHIPPED | OUTPATIENT
Start: 2025-02-18

## 2025-02-28 DIAGNOSIS — I10 PRIMARY HYPERTENSION: ICD-10-CM

## 2025-02-28 RX ORDER — LISINOPRIL 10 MG/1
10 TABLET ORAL DAILY
Qty: 30 TABLET | Refills: 2 | Status: SHIPPED | OUTPATIENT
Start: 2025-02-28

## 2025-03-10 ENCOUNTER — TELEPHONE (OUTPATIENT)
Dept: FAMILY MEDICINE | Facility: CLINIC | Age: 75
End: 2025-03-10
Payer: MEDICARE

## 2025-03-10 DIAGNOSIS — E78.2 MIXED HYPERLIPIDEMIA: ICD-10-CM

## 2025-03-10 RX ORDER — FENOFIBRATE 134 MG/1
134 CAPSULE ORAL
Qty: 30 CAPSULE | Refills: 2 | Status: SHIPPED | OUTPATIENT
Start: 2025-03-10

## 2025-03-10 NOTE — TELEPHONE ENCOUNTER
Patient called requesting a refill on Fenofibrate 134. [facility]Thanks

## 2025-03-11 ENCOUNTER — TELEPHONE (OUTPATIENT)
Dept: FAMILY MEDICINE | Facility: CLINIC | Age: 75
End: 2025-03-11
Payer: MEDICARE

## 2025-03-14 DIAGNOSIS — K21.9 GASTROESOPHAGEAL REFLUX DISEASE, UNSPECIFIED WHETHER ESOPHAGITIS PRESENT: ICD-10-CM

## 2025-03-17 DIAGNOSIS — I10 PRIMARY HYPERTENSION: ICD-10-CM

## 2025-03-17 RX ORDER — FUROSEMIDE 20 MG/1
20 TABLET ORAL EVERY MORNING
Qty: 90 TABLET | Refills: 1 | Status: SHIPPED | OUTPATIENT
Start: 2025-03-17

## 2025-03-17 RX ORDER — OMEPRAZOLE 40 MG/1
40 CAPSULE, DELAYED RELEASE ORAL EVERY MORNING
Qty: 30 CAPSULE | Refills: 1 | Status: SHIPPED | OUTPATIENT
Start: 2025-03-17

## 2025-03-27 ENCOUNTER — RESULTS FOLLOW-UP (OUTPATIENT)
Dept: FAMILY MEDICINE | Facility: CLINIC | Age: 75
End: 2025-03-27

## 2025-04-03 ENCOUNTER — OFFICE VISIT (OUTPATIENT)
Dept: FAMILY MEDICINE | Facility: CLINIC | Age: 75
End: 2025-04-03
Payer: MEDICARE

## 2025-04-03 VITALS
TEMPERATURE: 98 F | DIASTOLIC BLOOD PRESSURE: 69 MMHG | BODY MASS INDEX: 30.31 KG/M2 | OXYGEN SATURATION: 98 % | HEIGHT: 68 IN | RESPIRATION RATE: 18 BRPM | WEIGHT: 200 LBS | SYSTOLIC BLOOD PRESSURE: 135 MMHG | HEART RATE: 52 BPM

## 2025-04-03 DIAGNOSIS — E03.9 HYPOTHYROIDISM, UNSPECIFIED TYPE: ICD-10-CM

## 2025-04-03 DIAGNOSIS — J31.0 CHRONIC RHINITIS: ICD-10-CM

## 2025-04-03 DIAGNOSIS — E78.2 MIXED HYPERLIPIDEMIA: ICD-10-CM

## 2025-04-03 DIAGNOSIS — E11.9 TYPE 2 DIABETES MELLITUS WITHOUT COMPLICATION, WITHOUT LONG-TERM CURRENT USE OF INSULIN: Primary | ICD-10-CM

## 2025-04-03 DIAGNOSIS — I10 PRIMARY HYPERTENSION: ICD-10-CM

## 2025-04-03 PROBLEM — E66.01 CLASS 2 SEVERE OBESITY DUE TO EXCESS CALORIES WITH SERIOUS COMORBIDITY AND BODY MASS INDEX (BMI) OF 37.0 TO 37.9 IN ADULT: Status: RESOLVED | Noted: 2023-08-19 | Resolved: 2025-04-03

## 2025-04-03 PROBLEM — E66.812 CLASS 2 SEVERE OBESITY DUE TO EXCESS CALORIES WITH SERIOUS COMORBIDITY AND BODY MASS INDEX (BMI) OF 37.0 TO 37.9 IN ADULT: Status: RESOLVED | Noted: 2023-08-19 | Resolved: 2025-04-03

## 2025-04-03 PROCEDURE — 3008F BODY MASS INDEX DOCD: CPT | Mod: CPTII,,,

## 2025-04-03 PROCEDURE — 3075F SYST BP GE 130 - 139MM HG: CPT | Mod: CPTII,,,

## 2025-04-03 PROCEDURE — 99214 OFFICE O/P EST MOD 30 MIN: CPT | Mod: ,,,

## 2025-04-03 PROCEDURE — 3288F FALL RISK ASSESSMENT DOCD: CPT | Mod: CPTII,,,

## 2025-04-03 PROCEDURE — 3044F HG A1C LEVEL LT 7.0%: CPT | Mod: CPTII,,,

## 2025-04-03 PROCEDURE — 1160F RVW MEDS BY RX/DR IN RCRD: CPT | Mod: CPTII,,,

## 2025-04-03 PROCEDURE — 1101F PT FALLS ASSESS-DOCD LE1/YR: CPT | Mod: CPTII,,,

## 2025-04-03 PROCEDURE — 3078F DIAST BP <80 MM HG: CPT | Mod: CPTII,,,

## 2025-04-03 PROCEDURE — 1159F MED LIST DOCD IN RCRD: CPT | Mod: CPTII,,,

## 2025-04-03 PROCEDURE — 4010F ACE/ARB THERAPY RXD/TAKEN: CPT | Mod: CPTII,,,

## 2025-04-03 RX ORDER — LISINOPRIL 10 MG/1
10 TABLET ORAL DAILY
Qty: 90 TABLET | Refills: 3 | Status: SHIPPED | OUTPATIENT
Start: 2025-04-03

## 2025-04-03 RX ORDER — MONTELUKAST SODIUM 10 MG/1
10 TABLET ORAL DAILY
Qty: 90 TABLET | Refills: 3 | Status: SHIPPED | OUTPATIENT
Start: 2025-04-03

## 2025-04-03 RX ORDER — FENOFIBRATE 134 MG/1
134 CAPSULE ORAL
Qty: 90 CAPSULE | Refills: 3 | Status: SHIPPED | OUTPATIENT
Start: 2025-04-03

## 2025-04-03 RX ORDER — METOPROLOL SUCCINATE 50 MG/1
50 TABLET, EXTENDED RELEASE ORAL DAILY
Qty: 90 TABLET | Refills: 3 | Status: SHIPPED | OUTPATIENT
Start: 2025-04-03

## 2025-04-03 RX ORDER — FLUTICASONE PROPIONATE 50 MCG
1 SPRAY, SUSPENSION (ML) NASAL DAILY
Qty: 16 ML | Refills: 3 | Status: SHIPPED | OUTPATIENT
Start: 2025-04-03

## 2025-04-03 RX ORDER — EZETIMIBE AND SIMVASTATIN 10; 40 MG/1; MG/1
0.5 TABLET ORAL NIGHTLY
Qty: 90 TABLET | Refills: 3 | Status: SHIPPED | OUTPATIENT
Start: 2025-04-03

## 2025-04-03 RX ORDER — TIRZEPATIDE 15 MG/.5ML
15 INJECTION, SOLUTION SUBCUTANEOUS
Qty: 2 ML | Refills: 6 | Status: SHIPPED | OUTPATIENT
Start: 2025-04-03

## 2025-04-03 NOTE — PROGRESS NOTES
[facility]  Disha Cheek MD    Patient ID: [id number]     Chief Complaint: No chief complaint on file.      HPI:     Rocael Das is a 74 y.o. male here today for follow up of chronic conditions.    A1c 6.3 patient would like to increase his Mounjaro to 15 mg weekly.  He has lost about 45 lb over the last year.    Patient reports rhinorrhea and congestion for the last few weeks due to the pollen.    He is due for colonoscopy in August.     Past Medical History:   Diagnosis Date    Depression     Diabetes mellitus, type 2     GERD (gastroesophageal reflux disease)     H/O prostate cancer     Hypertension     Hypothyroidism     Insomnia     Mixed hyperlipidemia     Obese     DAWSON (obstructive sleep apnea)     Vitamin D deficiency         Past Surgical History:   Procedure Laterality Date    COLONOSCOPY  2022    Dr Borja  Repeat 2 years    CRYOTHERAPY  2013    PROSTATE SURGERY  2013        Social History     Tobacco Use    Smoking status: Former     Current packs/day: 0.00     Types: Cigarettes     Quit date:      Years since quittin.2    Smokeless tobacco: Never   Substance and Sexual Activity    Alcohol use: Not on file     Comment: holidays    Drug use: Never    Sexual activity: Not on file        Current Outpatient Medications   Medication Instructions    ALPRAZolam (XANAX) 0.5 mg, 2 times daily PRN    ascorbic acid (vitamin C) (VITAMIN C) 250 mg, Daily    aspirin (ECOTRIN) 81 mg, Daily    blood sugar diagnostic (ONETOUCH ULTRA TEST) Strp Use to test blood sugar once daily    blood-glucose meter kit Use as instructed    buPROPion (WELLBUTRIN XL) 150 mg, Every morning    empagliflozin (JARDIANCE) 25 mg, Oral, Daily    ezetimibe-simvastatin 10-40 mg (VYTORIN) 10-40 mg per tablet 0.5 tablets, Oral, Nightly, Patient taking 1/2 tablet daily    fenofibrate micronized (LOFIBRA) 134 mg, Oral, With breakfast    ferrous sulfate (IRON ORAL) 65 mg, Daily    FLUoxetine 40 mg, Every  "morning    fluticasone propionate (FLONASE) 50 mcg, Each Nostril, Daily    furosemide (LASIX) 20 mg, Oral, Every morning    glucosamine chauhan 2KCl-chondroit 750-600 mg Tab 1 tablet, Nightly    L.acidophil/L.plantar/Bifido 7 (UP4 PROBIOTICS ADULT ORAL) Every Mon, Wed, Fri    levothyroxine (SYNTHROID) 75 mcg, Oral, Every morning    lisinopriL 10 mg, Oral, Daily    mesalamine (LIALDA) 4.8 g, With breakfast    metoprolol succinate (TOPROL-XL) 50 mg, Oral, Daily    montelukast (SINGULAIR) 10 mg, Oral, Daily    MOUNJARO 15 mg, Subcutaneous, Every 7 days    omeprazole (PRILOSEC) 40 mg, Oral, Every morning    ONETOUCH DELICA PLUS LANCET 33 gauge Misc SMARTSIG:Via Meter Daily    oxybutynin (DITROPAN) 5 mg, 2 times daily    vitamin D (VITAMIN D3) 5,000 Units, Daily    zolpidem (AMBIEN) 10 mg, Nightly       Review of patient's allergies indicates:   Allergen Reactions    Balsalazide Other (See Comments)    Macrolide antibiotics     Pcn [penicillins]     Erythromycin Rash        Patient Care Team:  Donnie Das Sr., MD as PCP - General (Family Medicine)  Hardeep Ochoa FNP (Nurse Practitioner)  Andrey Borja MD as Consulting Physician (Gastroenterology)  Jourdan Flanagan MD as Consulting Physician (Pulmonary Disease)  Maurice Mcmanus, WENDY (Ophthalmology)  Dakotah Wheat MD as Consulting Physician (Urology)  Hardeep Ochoa FNP (Nurse Practitioner)     Subjective:     Review of Systems    12 point review of systems conducted, negative except as stated in the history of present illness. See HPI for details.    Objective:     Visit Vitals  /69   Pulse (!) 52   Temp 97.7 °F (36.5 °C) (Skin)   Resp 18   Ht 5' 8" (1.727 m)   Wt 90.7 kg (200 lb)   SpO2 98%   BMI 30.41 kg/m²       Physical Exam  Vitals and nursing note reviewed.   Constitutional:       General: He is not in acute distress.     Appearance: Normal appearance. He is not ill-appearing or diaphoretic.   HENT:      Head: Normocephalic and " atraumatic.      Mouth/Throat:      Mouth: Mucous membranes are moist.      Pharynx: Oropharynx is clear.   Eyes:      Extraocular Movements: Extraocular movements intact.      Conjunctiva/sclera: Conjunctivae normal.   Cardiovascular:      Rate and Rhythm: Normal rate and regular rhythm.      Pulses: Normal pulses.           Dorsalis pedis pulses are 2+ on the right side and 2+ on the left side.        Posterior tibial pulses are 2+ on the right side and 2+ on the left side.      Heart sounds: No murmur heard.  Pulmonary:      Effort: Pulmonary effort is normal. No respiratory distress.      Breath sounds: Normal breath sounds. No wheezing, rhonchi or rales.   Musculoskeletal:      Right lower leg: No edema.      Left lower leg: No edema.      Right foot: No deformity.      Left foot: No deformity.   Feet:      Right foot:      Protective Sensation: 5 sites tested.  5 sites sensed.      Skin integrity: Skin integrity normal. No ulcer, blister, skin breakdown, erythema, warmth, callus, dry skin or fissure.      Toenail Condition: Right toenails are normal.      Left foot:      Protective Sensation: 5 sites tested.  5 sites sensed.      Skin integrity: Skin integrity normal. No ulcer, blister, skin breakdown, erythema, warmth, callus, dry skin or fissure.      Toenail Condition: Left toenails are normal.   Skin:     General: Skin is warm and dry.   Neurological:      General: No focal deficit present.      Mental Status: He is alert and oriented to person, place, and time. Mental status is at baseline.   Psychiatric:         Mood and Affect: Mood normal.         Labs Reviewed:     Chemistry:  Lab Results   Component Value Date     03/26/2025    K 4.3 03/26/2025    BUN 18 03/26/2025    CREATININE 0.80 03/26/2025    EGFRNORACEVR 93 03/26/2025    CALCIUM 9.6 03/26/2025    ALBUMIN 4.3 03/26/2025    AST 17 03/26/2025    ALT 15 03/26/2025    OQNYENBC15RW 50.7 09/05/2024    TSH 2.830 09/05/2024        Lab Results  "  Component Value Date    HGBA1C 6.3 (H) 03/26/2025        Hematology:  Lab Results   Component Value Date    WBC 4.1 03/26/2025    HGB 13.4 03/26/2025    HCT 43.1 03/26/2025     03/26/2025       Lipid Panel:  Lab Results   Component Value Date    CHOL 133 09/05/2024    HDL 40 09/05/2024    TRIG 178 (H) 09/05/2024        Urine:  No results found for: "COLORUA", "APPEARANCEUA", "SGUA", "PHUA", "PROTEINUA", "GLUCOSEUA", "KETONESUA", "BLOODUA", "NITRITESUA", "LEUKOCYTESUR", "RBCUA", "WBCUA", "BACTERIA", "SQEPUA", "HYALINECASTS", "CREATRANDUR", "PROTEINURINE", "UPROTCREA"     Assessment:       ICD-10-CM ICD-9-CM   1. Type 2 diabetes mellitus without complication, without long-term current use of insulin  E11.9 250.00   2. Primary hypertension  I10 401.9   3. Hypothyroidism, unspecified type  E03.9 244.9   4. Mixed hyperlipidemia  E78.2 272.2   5. Chronic rhinitis  J31.0 472.0        Plan:     1. Type 2 diabetes mellitus without complication, without long-term current use of insulin  Overview:  Mounjaro 15 mg weekly    At goal.  Continue above regimen    Lab Results   Component Value Date    HGBA1C 6.3 (H) 03/26/2025    HGBA1C 6.4 (H) 09/05/2024    HGBA1C 7.2 (A) 05/10/2023    HGBA1C 7.2 (A) 05/10/2023    CREATININE 0.80 03/26/2025    On ACE and Statin according to guidelines.  Discussed caution with SGLT2s + diuretics as concomitant use can cause volume depletion.   Follow ADA Diet. Avoid soda, simple sweets, and limit rice/pasta/breads/starches (no more than 45-50 grams per meal).  Maintain healthy weight with goal BMI <30.  Exercise 5 times per week for 30 minutes per day.  Stressed importance of daily foot exams.  Stressed importance of annual dilated eye exam.      Assessment & Plan:  At goal. Increase Mounjaro to 15mg. Will stop Jardiance per patient preference.     Orders:  -     tirzepatide (MOUNJARO) 15 mg/0.5 mL PnIj; Inject 15 mg into the skin every 7 days.  Dispense: 2 mL; Refill: 6  -     Hemoglobin " A1C; Future; Expected date: 07/03/2025  -     Microalbumin/Creatinine Ratio, Urine; Future; Expected date: 07/03/2025    2. Primary hypertension  Overview:  Continue present med tx.  Metoprolol 50 mg daily, lisinopril 10 mg daily, furosemide 20 mg q.a.m. HCTZ 12.5 mg daily.      Low Sodium Diet (DASH Diet - Less than 2 grams of sodium per day).  Monitor blood pressure daily and log. Report consistent numbers greater than 140/90.  Maintain healthy weight with goal BMI <30. Exercise 30 minutes per day, 5 days per week.    Assessment & Plan:  Well-controlled continue current regimen    Orders:  -     CBC Auto Differential; Future; Expected date: 07/03/2025  -     Comprehensive Metabolic Panel; Future; Expected date: 07/03/2025  -     metoprolol succinate (TOPROL-XL) 50 MG 24 hr tablet; Take 1 tablet (50 mg total) by mouth once daily.  Dispense: 90 tablet; Refill: 3  -     lisinopriL 10 MG tablet; Take 1 tablet (10 mg total) by mouth Daily.  Dispense: 90 tablet; Refill: 3    3. Hypothyroidism, unspecified type    4. Mixed hyperlipidemia  Overview:  Lab Results   Component Value Date    TRIG 178 (H) 09/05/2024    HDL 40 09/05/2024    LDLCALC 63 09/05/2024     Hyperlipidemia Medications               ezetimibe-simvastatin 10-40 mg (VYTORIN) 10-40 mg per tablet Take 1 tablet by mouth every evening.    fenofibrate micronized (LOFIBRA) 134 MG Cap Take 1 capsule (134 mg total) by mouth daily with breakfast.     Stressed importance of dietary modifications. Follow a low cholesterol, low saturated fat diet with less that 200mg of cholesterol a day.  Avoid fried foods and high saturated fats (high saturated fats less than 7% of calories).  Add Flax Seed/Fish Oil supplements to diet. Increase dietary fiber.  Regular exercise can reduce LDL and raise HDL. Stressed importance of physical activity 5 times per week for 30 minutes per day.     Assessment & Plan:  Repeat lipid panel in 3 months.  Well-controlled.  Continue above  medicines.    Orders:  -     Lipid Panel; Future; Expected date: 07/03/2025  -     ezetimibe-simvastatin 10-40 mg (VYTORIN) 10-40 mg per tablet; Take 0.5 tablets by mouth every evening. Patient taking 1/2 tablet daily  Dispense: 90 tablet; Refill: 3  -     fenofibrate micronized (LOFIBRA) 134 MG Cap; Take 1 capsule (134 mg total) by mouth daily with breakfast.  Dispense: 90 capsule; Refill: 3    5. Chronic rhinitis  -     fluticasone propionate (FLONASE) 50 mcg/actuation nasal spray; 1 spray (50 mcg total) by Each Nostril route once daily.  Dispense: 16 mL; Refill: 3  -     montelukast (SINGULAIR) 10 mg tablet; Take 1 tablet (10 mg total) by mouth once daily.  Dispense: 90 tablet; Refill: 3         No follow-ups on file. In addition to their scheduled follow up, the patient has also been instructed to follow up on as needed basis.     Future Appointments   Date Time Provider Department Center   7/9/2025  8:20 AM [facility], [facility]   7/16/2025  2:00 PM PROVIDER, [facility] Cheek MD

## 2025-04-22 DIAGNOSIS — E03.9 HYPOTHYROIDISM, UNSPECIFIED TYPE: ICD-10-CM

## 2025-04-22 RX ORDER — LEVOTHYROXINE SODIUM 75 UG/1
75 TABLET ORAL EVERY MORNING
Qty: 90 TABLET | Refills: 1 | Status: SHIPPED | OUTPATIENT
Start: 2025-04-22

## 2025-05-19 ENCOUNTER — TELEPHONE (OUTPATIENT)
Dept: FAMILY MEDICINE | Facility: CLINIC | Age: 75
End: 2025-05-19
Payer: MEDICARE

## 2025-05-19 DIAGNOSIS — E11.9 TYPE 2 DIABETES MELLITUS WITHOUT COMPLICATION, WITHOUT LONG-TERM CURRENT USE OF INSULIN: ICD-10-CM

## 2025-05-20 DIAGNOSIS — E11.9 TYPE 2 DIABETES MELLITUS WITHOUT COMPLICATION, WITHOUT LONG-TERM CURRENT USE OF INSULIN: ICD-10-CM

## 2025-06-21 DIAGNOSIS — K21.9 GASTROESOPHAGEAL REFLUX DISEASE, UNSPECIFIED WHETHER ESOPHAGITIS PRESENT: ICD-10-CM

## 2025-06-25 RX ORDER — OMEPRAZOLE 40 MG/1
40 CAPSULE, DELAYED RELEASE ORAL EVERY MORNING
Qty: 30 CAPSULE | Refills: 0 | Status: SHIPPED | OUTPATIENT
Start: 2025-06-25 | End: 2025-07-25

## 2025-07-28 DIAGNOSIS — K21.9 GASTROESOPHAGEAL REFLUX DISEASE, UNSPECIFIED WHETHER ESOPHAGITIS PRESENT: ICD-10-CM

## 2025-07-31 RX ORDER — OMEPRAZOLE 40 MG/1
40 CAPSULE, DELAYED RELEASE ORAL EVERY MORNING
Qty: 30 CAPSULE | Refills: 0 | Status: SHIPPED | OUTPATIENT
Start: 2025-07-31 | End: 2025-08-30

## 2025-08-29 DIAGNOSIS — K21.9 GASTROESOPHAGEAL REFLUX DISEASE, UNSPECIFIED WHETHER ESOPHAGITIS PRESENT: ICD-10-CM

## 2025-09-02 RX ORDER — OMEPRAZOLE 40 MG/1
40 CAPSULE, DELAYED RELEASE ORAL EVERY MORNING
Qty: 30 CAPSULE | Refills: 0 | Status: SHIPPED | OUTPATIENT
Start: 2025-09-02